# Patient Record
Sex: FEMALE | Race: WHITE | Employment: OTHER | URBAN - METROPOLITAN AREA
[De-identification: names, ages, dates, MRNs, and addresses within clinical notes are randomized per-mention and may not be internally consistent; named-entity substitution may affect disease eponyms.]

---

## 2021-05-24 ENCOUNTER — APPOINTMENT (OUTPATIENT)
Dept: CT IMAGING | Age: 41
End: 2021-05-24
Attending: EMERGENCY MEDICINE
Payer: MEDICAID

## 2021-05-24 ENCOUNTER — APPOINTMENT (OUTPATIENT)
Dept: NUCLEAR MEDICINE | Age: 41
End: 2021-05-24
Attending: EMERGENCY MEDICINE
Payer: MEDICAID

## 2021-05-24 ENCOUNTER — APPOINTMENT (OUTPATIENT)
Dept: CT IMAGING | Age: 41
End: 2021-05-24
Attending: FAMILY MEDICINE
Payer: MEDICAID

## 2021-05-24 ENCOUNTER — HOSPITAL ENCOUNTER (OUTPATIENT)
Age: 41
Setting detail: OBSERVATION
Discharge: HOME | End: 2021-05-27
Attending: EMERGENCY MEDICINE | Admitting: FAMILY MEDICINE
Payer: MEDICAID

## 2021-05-24 ENCOUNTER — APPOINTMENT (OUTPATIENT)
Dept: ULTRASOUND IMAGING | Age: 41
End: 2021-05-24
Attending: EMERGENCY MEDICINE
Payer: MEDICAID

## 2021-05-24 ENCOUNTER — APPOINTMENT (OUTPATIENT)
Dept: GENERAL RADIOLOGY | Age: 41
End: 2021-05-24
Attending: EMERGENCY MEDICINE
Payer: MEDICAID

## 2021-05-24 DIAGNOSIS — N30.00 ACUTE CYSTITIS WITHOUT HEMATURIA: ICD-10-CM

## 2021-05-24 DIAGNOSIS — R73.9 HYPERGLYCEMIA: ICD-10-CM

## 2021-05-24 DIAGNOSIS — R94.31 ABNORMAL EKG: ICD-10-CM

## 2021-05-24 DIAGNOSIS — R53.83 FATIGUE, UNSPECIFIED TYPE: Primary | ICD-10-CM

## 2021-05-24 DIAGNOSIS — D72.829 LEUKOCYTOSIS, UNSPECIFIED TYPE: ICD-10-CM

## 2021-05-24 PROBLEM — R09.02 HYPOXIA: Status: ACTIVE | Noted: 2021-05-24

## 2021-05-24 PROBLEM — R93.89 ABNORMAL CXR: Status: ACTIVE | Noted: 2021-05-24

## 2021-05-24 LAB
ALBUMIN SERPL-MCNC: 1.8 G/DL (ref 3.4–5)
ALBUMIN/GLOB SERPL: 0.4 {RATIO} (ref 0.8–1.7)
ALP SERPL-CCNC: 934 U/L (ref 45–117)
ALT SERPL-CCNC: 27 U/L (ref 13–56)
ANION GAP SERPL CALC-SCNC: 14 MMOL/L (ref 3–18)
APPEARANCE UR: ABNORMAL
ARTERIAL PATENCY WRIST A: ABNORMAL
AST SERPL-CCNC: 37 U/L (ref 10–38)
BACTERIA URNS QL MICRO: ABNORMAL /HPF
BASE DEFICIT BLD-SCNC: 3.4 MMOL/L
BASOPHILS # BLD: 0 K/UL (ref 0–0.1)
BASOPHILS NFR BLD: 0 % (ref 0–2)
BDY SITE: ABNORMAL
BILIRUB SERPL-MCNC: 3.5 MG/DL (ref 0.2–1)
BILIRUB UR QL: ABNORMAL
BODY TEMPERATURE: 97.7
BUN SERPL-MCNC: 36 MG/DL (ref 7–18)
BUN/CREAT SERPL: 26 (ref 12–20)
CALCIUM SERPL-MCNC: 9.1 MG/DL (ref 8.5–10.1)
CHLORIDE SERPL-SCNC: 88 MMOL/L (ref 100–111)
CK MB CFR SERPL CALC: ABNORMAL % (ref 0–4)
CK MB SERPL-MCNC: <1 NG/ML (ref 5–25)
CK SERPL-CCNC: 14 U/L (ref 26–192)
CO2 SERPL-SCNC: 23 MMOL/L (ref 21–32)
COLOR UR: ABNORMAL
CREAT SERPL-MCNC: 1.38 MG/DL (ref 0.6–1.3)
DIFFERENTIAL METHOD BLD: ABNORMAL
EOSINOPHIL # BLD: 0 K/UL (ref 0–0.4)
EOSINOPHIL NFR BLD: 0 % (ref 0–5)
EPITH CASTS URNS QL MICRO: ABNORMAL /LPF (ref 0–5)
ERYTHROCYTE [DISTWIDTH] IN BLOOD BY AUTOMATED COUNT: 13.8 % (ref 11.6–14.5)
GAS FLOW.O2 O2 DELIVERY SYS: ABNORMAL L/MIN
GAS FLOW.O2 SETTING OXYMISER: 22 BPM
GLOBULIN SER CALC-MCNC: 5.1 G/DL (ref 2–4)
GLUCOSE BLD STRIP.AUTO-MCNC: 493 MG/DL (ref 70–110)
GLUCOSE BLD STRIP.AUTO-MCNC: 501 MG/DL (ref 70–110)
GLUCOSE BLD STRIP.AUTO-MCNC: 501 MG/DL (ref 70–110)
GLUCOSE BLD STRIP.AUTO-MCNC: 533 MG/DL (ref 70–110)
GLUCOSE BLD STRIP.AUTO-MCNC: 567 MG/DL (ref 70–110)
GLUCOSE BLD STRIP.AUTO-MCNC: 586 MG/DL (ref 70–110)
GLUCOSE BLD STRIP.AUTO-MCNC: >600 MG/DL (ref 70–110)
GLUCOSE SERPL-MCNC: 571 MG/DL (ref 74–99)
GLUCOSE UR STRIP.AUTO-MCNC: >1000 MG/DL
HCG UR QL: NEGATIVE
HCO3 BLD-SCNC: 20.5 MMOL/L (ref 22–26)
HCT VFR BLD AUTO: 32.9 % (ref 35–45)
HGB BLD-MCNC: 10.8 G/DL (ref 12–16)
HGB UR QL STRIP: ABNORMAL
KETONES UR QL STRIP.AUTO: 15 MG/DL
LACTATE BLD-SCNC: 1.09 MMOL/L (ref 0.4–2)
LEUKOCYTE ESTERASE UR QL STRIP.AUTO: ABNORMAL
LYMPHOCYTES # BLD: 0.7 K/UL (ref 0.9–3.6)
LYMPHOCYTES NFR BLD: 3 % (ref 21–52)
MCH RBC QN AUTO: 30.3 PG (ref 24–34)
MCHC RBC AUTO-ENTMCNC: 32.8 G/DL (ref 31–37)
MCV RBC AUTO: 92.4 FL (ref 74–97)
MONOCYTES # BLD: 1 K/UL (ref 0.05–1.2)
MONOCYTES NFR BLD: 4 % (ref 3–10)
NEUTS BAND NFR BLD MANUAL: 6 % (ref 0–5)
NEUTS SEG # BLD: 23.1 K/UL (ref 1.8–8)
NEUTS SEG NFR BLD: 87 % (ref 40–73)
NITRITE UR QL STRIP.AUTO: NEGATIVE
O2/TOTAL GAS SETTING VFR VENT: 21 %
PCO2 BLD: 31.5 MMHG (ref 35–45)
PH BLD: 7.42 [PH] (ref 7.35–7.45)
PH UR STRIP: 5.5 [PH] (ref 5–8)
PLATELET # BLD AUTO: 416 K/UL (ref 135–420)
PMV BLD AUTO: 11.2 FL (ref 9.2–11.8)
PO2 BLD: 68 MMHG (ref 80–100)
POTASSIUM SERPL-SCNC: 3.5 MMOL/L (ref 3.5–5.5)
PROT SERPL-MCNC: 6.9 G/DL (ref 6.4–8.2)
PROT UR STRIP-MCNC: 30 MG/DL
RBC # BLD AUTO: 3.56 M/UL (ref 4.2–5.3)
RBC #/AREA URNS HPF: ABNORMAL /HPF (ref 0–5)
RBC MORPH BLD: ABNORMAL
SAO2 % BLD: 94.4 % (ref 92–97)
SERVICE CMNT-IMP: ABNORMAL
SODIUM SERPL-SCNC: 125 MMOL/L (ref 136–145)
SP GR UR REFRACTOMETRY: 1.02 (ref 1–1.03)
SPECIMEN TYPE: ABNORMAL
TROPONIN I SERPL-MCNC: 0.09 NG/ML (ref 0–0.04)
UROBILINOGEN UR QL STRIP.AUTO: 1 EU/DL (ref 0.2–1)
WBC # BLD AUTO: 24.8 K/UL (ref 4.6–13.2)
WBC URNS QL MICRO: ABNORMAL /HPF (ref 0–5)

## 2021-05-24 PROCEDURE — 99218 HC RM OBSERVATION: CPT

## 2021-05-24 PROCEDURE — 82553 CREATINE MB FRACTION: CPT

## 2021-05-24 PROCEDURE — 82962 GLUCOSE BLOOD TEST: CPT

## 2021-05-24 PROCEDURE — 74011636637 HC RX REV CODE- 636/637: Performed by: FAMILY MEDICINE

## 2021-05-24 PROCEDURE — 96375 TX/PRO/DX INJ NEW DRUG ADDON: CPT

## 2021-05-24 PROCEDURE — 76705 ECHO EXAM OF ABDOMEN: CPT

## 2021-05-24 PROCEDURE — 96365 THER/PROPH/DIAG IV INF INIT: CPT

## 2021-05-24 PROCEDURE — 99285 EMERGENCY DEPT VISIT HI MDM: CPT

## 2021-05-24 PROCEDURE — 93005 ELECTROCARDIOGRAM TRACING: CPT

## 2021-05-24 PROCEDURE — 70450 CT HEAD/BRAIN W/O DYE: CPT

## 2021-05-24 PROCEDURE — 96366 THER/PROPH/DIAG IV INF ADDON: CPT

## 2021-05-24 PROCEDURE — 87040 BLOOD CULTURE FOR BACTERIA: CPT

## 2021-05-24 PROCEDURE — 71045 X-RAY EXAM CHEST 1 VIEW: CPT

## 2021-05-24 PROCEDURE — 81001 URINALYSIS AUTO W/SCOPE: CPT

## 2021-05-24 PROCEDURE — 83605 ASSAY OF LACTIC ACID: CPT

## 2021-05-24 PROCEDURE — 36600 WITHDRAWAL OF ARTERIAL BLOOD: CPT

## 2021-05-24 PROCEDURE — 74011636637 HC RX REV CODE- 636/637: Performed by: EMERGENCY MEDICINE

## 2021-05-24 PROCEDURE — 74011250636 HC RX REV CODE- 250/636: Performed by: EMERGENCY MEDICINE

## 2021-05-24 PROCEDURE — 74011250636 HC RX REV CODE- 250/636: Performed by: FAMILY MEDICINE

## 2021-05-24 PROCEDURE — 81025 URINE PREGNANCY TEST: CPT

## 2021-05-24 PROCEDURE — 96376 TX/PRO/DX INJ SAME DRUG ADON: CPT

## 2021-05-24 PROCEDURE — 82803 BLOOD GASES ANY COMBINATION: CPT

## 2021-05-24 PROCEDURE — 80053 COMPREHEN METABOLIC PANEL: CPT

## 2021-05-24 PROCEDURE — 85025 COMPLETE CBC W/AUTO DIFF WBC: CPT

## 2021-05-24 PROCEDURE — 74011000250 HC RX REV CODE- 250: Performed by: EMERGENCY MEDICINE

## 2021-05-24 PROCEDURE — 87086 URINE CULTURE/COLONY COUNT: CPT

## 2021-05-24 PROCEDURE — 87389 HIV-1 AG W/HIV-1&-2 AB AG IA: CPT

## 2021-05-24 PROCEDURE — 96372 THER/PROPH/DIAG INJ SC/IM: CPT

## 2021-05-24 PROCEDURE — 65270000029 HC RM PRIVATE

## 2021-05-24 PROCEDURE — 96367 TX/PROPH/DG ADDL SEQ IV INF: CPT

## 2021-05-24 RX ORDER — ENOXAPARIN SODIUM 100 MG/ML
1.5 INJECTION SUBCUTANEOUS
Status: COMPLETED | OUTPATIENT
Start: 2021-05-24 | End: 2021-05-24

## 2021-05-24 RX ORDER — POLYETHYLENE GLYCOL 3350 17 G/17G
17 POWDER, FOR SOLUTION ORAL DAILY PRN
Status: DISCONTINUED | OUTPATIENT
Start: 2021-05-24 | End: 2021-05-27 | Stop reason: HOSPADM

## 2021-05-24 RX ORDER — ACETAMINOPHEN 650 MG/1
650 SUPPOSITORY RECTAL
Status: DISCONTINUED | OUTPATIENT
Start: 2021-05-24 | End: 2021-05-27 | Stop reason: HOSPADM

## 2021-05-24 RX ORDER — ENOXAPARIN SODIUM 100 MG/ML
40 INJECTION SUBCUTANEOUS DAILY
Status: DISCONTINUED | OUTPATIENT
Start: 2021-05-25 | End: 2021-05-24

## 2021-05-24 RX ORDER — SODIUM CHLORIDE 0.9 % (FLUSH) 0.9 %
5-40 SYRINGE (ML) INJECTION AS NEEDED
Status: DISCONTINUED | OUTPATIENT
Start: 2021-05-24 | End: 2021-05-27 | Stop reason: HOSPADM

## 2021-05-24 RX ORDER — PROMETHAZINE HYDROCHLORIDE 25 MG/1
12.5 TABLET ORAL
Status: DISCONTINUED | OUTPATIENT
Start: 2021-05-24 | End: 2021-05-27 | Stop reason: HOSPADM

## 2021-05-24 RX ORDER — ACETAMINOPHEN 325 MG/1
650 TABLET ORAL
Status: DISCONTINUED | OUTPATIENT
Start: 2021-05-24 | End: 2021-05-27 | Stop reason: HOSPADM

## 2021-05-24 RX ORDER — SODIUM CHLORIDE 0.9 % (FLUSH) 0.9 %
5-40 SYRINGE (ML) INJECTION EVERY 8 HOURS
Status: DISCONTINUED | OUTPATIENT
Start: 2021-05-25 | End: 2021-05-26

## 2021-05-24 RX ORDER — ONDANSETRON 2 MG/ML
4 INJECTION INTRAMUSCULAR; INTRAVENOUS
Status: DISCONTINUED | OUTPATIENT
Start: 2021-05-24 | End: 2021-05-27 | Stop reason: HOSPADM

## 2021-05-24 RX ORDER — NYSTATIN 100000 [USP'U]/ML
500000 SUSPENSION ORAL 4 TIMES DAILY
Status: DISCONTINUED | OUTPATIENT
Start: 2021-05-24 | End: 2021-05-27 | Stop reason: HOSPADM

## 2021-05-24 RX ORDER — FAMOTIDINE 20 MG/1
20 TABLET, FILM COATED ORAL 2 TIMES DAILY
Status: DISCONTINUED | OUTPATIENT
Start: 2021-05-24 | End: 2021-05-27 | Stop reason: HOSPADM

## 2021-05-24 RX ADMIN — SODIUM CHLORIDE 1000 ML: 900 INJECTION, SOLUTION INTRAVENOUS at 23:29

## 2021-05-24 RX ADMIN — HUMAN INSULIN 10 UNITS: 100 INJECTION, SOLUTION SUBCUTANEOUS at 22:38

## 2021-05-24 RX ADMIN — AZITHROMYCIN MONOHYDRATE 500 MG: 500 INJECTION, POWDER, LYOPHILIZED, FOR SOLUTION INTRAVENOUS at 21:44

## 2021-05-24 RX ADMIN — CEFTRIAXONE 1 G: 1 INJECTION, POWDER, FOR SOLUTION INTRAMUSCULAR; INTRAVENOUS at 20:45

## 2021-05-24 RX ADMIN — SODIUM CHLORIDE 1000 ML: 900 INJECTION, SOLUTION INTRAVENOUS at 21:44

## 2021-05-24 RX ADMIN — SODIUM CHLORIDE 1000 ML: 900 INJECTION, SOLUTION INTRAVENOUS at 17:54

## 2021-05-24 RX ADMIN — ENOXAPARIN SODIUM 160 MG: 80 INJECTION SUBCUTANEOUS at 22:24

## 2021-05-24 RX ADMIN — HUMAN INSULIN 10 UNITS: 100 INJECTION, SOLUTION SUBCUTANEOUS at 19:23

## 2021-05-24 NOTE — ED PROVIDER NOTES
EMERGENCY DEPARTMENT HISTORY AND PHYSICAL EXAM    Date: 5/24/2021  Patient Name: Katy Starr    History of Presenting Illness     Chief Complaint   Patient presents with    Fatigue       History Provided By: Patient     History Alonsoian Cabot):   6:16 PM  Katy Starr is a 38 y.o. female with PMHX of Diabetes, hypertension, hypothyroid, neuropathy, thrush who presents to the emergency department C/O fatigue onset several days ago. Associated sxs include lightheadedness. Pt denies any other sxs or complaints. Patient states she has had several visits to [facility] in [facility] with a diagnosis of UTI and an elevated white blood cell count states that she had negative blood cultures from those visits. Patient was off of home medications for a while, she is recently seen her doctor who has recently restarted all of her meds. Chief Complaint: Fatigue  Onset: Several days ago  Timing: Acute  Context: Patient is unsure brought symptoms on, symptoms have progressively, worsened since onset  Location: Generalized  Quality: Painless  Severity: Moderate  Modifying Factors: Nothing makes it better, or worse.   Associated Symptoms: Lightheadedness    PCP: Olivia Aparicio MD    Past History         Past Medical History:  Past Medical History:   Diagnosis Date    Diabetes ([facility] [address]     Hypertension     Hypothyroidism     Hypothyroidism     Morbid obesity ([facility] [address]     Neuropathy     Thrush        Past Surgical History:  Past Surgical History:   Procedure Laterality Date    HX TONSILLECTOMY         Family History:  Family History   Problem Relation Age of Onset    Asthma Mother     Hypertension Mother     Asthma Brother     Diabetes Maternal Grandmother     Hypertension Maternal Grandmother     Cancer Paternal Grandfather      Reviewed and non-contributory    Social History:  Social History     Tobacco Use    Smoking status: Never Smoker    Smokeless tobacco: Never Used   Substance Use Topics    Alcohol use: Never    Drug use: Never       Allergies: Allergies   Allergen Reactions    Codeine Itching    Gabapentin Other (comments)     \"Makes my skin crawl\"    Lyrica [Pregabalin] Other (comments)     \"Makes my skin crawl\"         Review of Systems      Review of Systems   Constitutional: Negative for chills and fever. HENT: Negative for congestion, rhinorrhea and sore throat. Eyes: Negative for pain and visual disturbance. Respiratory: Negative for cough, shortness of breath and wheezing. Cardiovascular: Positive for chest pain. Negative for palpitations. Gastrointestinal: Negative for abdominal pain, diarrhea and vomiting. Genitourinary: Negative for dysuria, flank pain, frequency and urgency. Musculoskeletal: Negative for arthralgias and myalgias. Skin: Negative for rash and wound. Neurological: Positive for weakness and light-headedness. Negative for speech difficulty and headaches. Psychiatric/Behavioral: Negative for agitation and confusion. All other systems reviewed and are negative. Physical Exam     Vitals:    05/27/21 0503 05/27/21 0754 05/27/21 1139 05/27/21 1318   BP: 134/82 (!) 146/86 (!) 146/89    Pulse: 77 78 77    Resp: 16 17 18    Temp: 97.6 °F (36.4 °C) 97.9 °F (36.6 °C) 97.7 °F (36.5 °C)    SpO2: 97% 100% 96%    Weight:       Height:    5' 4\" (1.626 m)       Physical Exam  Vitals and nursing note reviewed. Constitutional:       General: She is not in acute distress. Appearance: Normal appearance. She is normal weight. She is not ill-appearing. HENT:      Head: Normocephalic and atraumatic. Nose: Nose normal. No rhinorrhea. Mouth/Throat:      Mouth: Mucous membranes are moist.      Pharynx: No oropharyngeal exudate or posterior oropharyngeal erythema. Eyes:      Extraocular Movements: Extraocular movements intact. Conjunctiva/sclera: Conjunctivae normal.      Pupils: Pupils are equal, round, and reactive to light.    Cardiovascular: Rate and Rhythm: Normal rate and regular rhythm. Heart sounds: No murmur heard. No friction rub. No gallop. Pulmonary:      Effort: Pulmonary effort is normal. No respiratory distress. Breath sounds: Normal breath sounds. No wheezing, rhonchi or rales. Abdominal:      General: Bowel sounds are normal.      Palpations: Abdomen is soft. Tenderness: There is no abdominal tenderness. There is no guarding or rebound. Musculoskeletal:         General: No swelling, tenderness or deformity. Normal range of motion. Cervical back: Normal range of motion and neck supple. No rigidity. Lymphadenopathy:      Cervical: No cervical adenopathy. Skin:     General: Skin is warm and dry. Findings: No rash. Neurological:      General: No focal deficit present. Mental Status: She is alert and oriented to person, place, and time. Psychiatric:         Mood and Affect: Mood normal.         Behavior: Behavior normal.         Diagnostic Study Results     Labs -     No results found for this or any previous visit (from the past 12 hour(s)). Radiologic Studies -   NM HEPATOBILIARY DUCT SCAN   Final Result   ------------------------------------------------------------------------------   1. Normal filling of the gallbladder. ------------------------------------------------------------------------------      CT CHEST ABD PELV WO CONT   Final Result      Linear opacities at the lung bases most consistent with atelectasis and/or   scarring. Cholelithiasis. Minimal infiltrative change along the pancreas is a subtle finding. Please   correlate with pancreatic enzymes. NM LUNG SCAN PERF   Final Result      No evidence of pulmonary embolism. US ABD LTD   Final Result      Sludge and gallstones in the gallbladder without evidence of acute   cholecystitis. No acute or focal abnormality otherwise to explain patient's right upper   quadrant pain.       Enlarged liver with hepatic steatosis      CT HEAD WO CONT   Final Result      No acute intracranial abnormalities. XR CHEST PORT   Final Result   Minimal bibasilar atelectasis or infiltrate with bronchitis. CT Results  (Last 48 hours)    None        CXR Results  (Last 48 hours)               05/29/21 1650  XR CHEST PORT Final result    Impression:  Platelike atelectasis at both lung bases with blunting left   costophrenic angle suggesting small effusion and/or airspace disease. Narrative:  EXAM:  AP Portable Chest X-ray 1 view        INDICATION: Edema       COMPARISON: May 25, 2021       _______________       FINDINGS: Cardiac size appears enlarged partly due to AP magnification and   mediastinal contours are within normal limits for portable radiograph. There is   platelike atelectasis left midlung. Also platelike atelectasis at the right lung   base. There is blunting of the left costophrenic angle suggesting small effusion   and/or airspace disease. No acute osseous findings.        ________________                     Medications given in the ED-  Medications   iopamidoL (ISOVUE 300) 61 % contrast injection  mL (has no administration in time range)   sodium chloride 0.9 % bolus infusion 1,000 mL (0 mL IntraVENous IV Completed 5/24/21 1854)   insulin regular (NOVOLIN R, HUMULIN R) injection 10 Units (10 Units IntraVENous Given 5/24/21 1923)   cefTRIAXone (ROCEPHIN) 1 g in sterile water (preservative free) 10 mL IV syringe (0 g IntraVENous IV Completed 5/24/21 2048)   sodium chloride 0.9 % bolus infusion 1,000 mL (0 mL IntraVENous IV Completed 5/24/21 2244)   azithromycin (ZITHROMAX) 500 mg in 0.9% sodium chloride 250 mL (VIAL-MATE) (0 mg IntraVENous IV Completed 5/24/21 2244)   enoxaparin (LOVENOX) injection 160 mg (160 mg SubCUTAneous Given 5/24/21 2224)   sodium chloride 0.9 % bolus infusion 1,000 mL (1,000 mL IntraVENous New Bag 5/24/21 2329)   insulin regular (NOVOLIN R, HUMULIN R) injection 10 Units (10 Units IntraVENous Given 5/24/21 2238)   technetium albumin aggregated (MAA) solution 6 millicurie (6 millicuries IntraVENous Given 5/24/21 2332)   insulin regular (NOVOLIN R, HUMULIN R) injection 10 Units (10 Units IntraVENous Given 5/25/21 0200)   potassium bicarb-citric acid (EFFER-K) tablet 40 mEq (40 mEq Oral Given 5/25/21 0534)   sodium chloride 0.9 % bolus infusion 1,000 mL (1,000 mL IntraVENous New Bag 5/25/21 0933)   potassium chloride (K-DUR, KLOR-CON) SR tablet 40 mEq (40 mEq Oral Given 5/25/21 1342)   sterile water (preservative free) injection (5 mL IntraVENous Given 5/26/21 1106)   [facility]) injection 2.09 mcg (2.09 mcg IntraVENous Given 5/26/21 1107)   technetium mebrofenin (CHOLETEC) injection 7.2 millicurie (7.2 millicuries IntraVENous Given 5/26/21 1137)         Medical Decision Making   I am the first provider for this patient. I reviewed the vital signs, available nursing notes, past medical history, past surgical history, family history and social history. Vital Signs-Reviewed the patient's vital signs. Pulse Oximetry Analysis - 99% on RA     Cardiac Monitor:  Rate: 92 bpm  Rhythm: tachycardic rhythm    EKG interpretation: (Preliminary)  Rhythm: Sinus tachycardia. Rate: 103 bpm; no STEMI  EKG read by Waleska Gomez MD at 5:08 PM    Records Reviewed: Nursing Notes    Provider Notes (Medical Decision Making):   DDX: DKA, UTI, hyperglycemia, sepsis, bacteremia    Procedures:  Procedures    ED Course:   6:16 PM Initial assessment performed. The patients presenting problems have been discussed, and they are in agreement with the care plan formulated and outlined with them. I have encouraged them to ask questions as they arise throughout their visit. SIGN OUT:  8:07 PM  Patient's presentation, labs/imaging and plan of care was reviewed with Bre Leigh MD  as part of sign out. They will follow up with US and accucheck as part of the plan discussed with the patient.   Gary's assistance in completion of this plan is greatly appreciated but it should be noted that I will be the provider of record for this patient. Written by Odilia Mcnulty MD.    10:00 PM Jose E Roger MD discussed with Dr. Arthur Jacobson for telemetry admission    Diagnosis and Disposition     Discussion:  38 y.o. female with hyperglycemia, likely noncompliance with outpatient medication. Patient also has a worsening leukocytosis. She has elevated LFTs. Patient had an ultrasound which did not demonstrate acute cholecystitis. However due to her markedly elevated blood glucose without acidosis she was still require admission to the hospital for hyperglycemia and diabetic education. Patient and family understand agree with this plan. Critical Care Time: 0 minutes    Core Measures:  For Hospitalized Patients:    1. Hospitalization Decision Time:  The decision to hospitalize the patient was made by Tyler Velásquez MD, MD at 8:07 on 5/24/2021    2. Aspirin: Aspirin was not given because the patient did not present with a stroke at the time of their Emergency Department evaluation    10:00 PM Patient is being admitted to the hospital by Dr. Arthur Jacobson. The results of their tests and reasons for their admission have been discussed with them and/or available family. They convey agreement and understanding for the need to be admitted and for their admission diagnosis. CONDITIONS ON ADMISSION:  Sepsis is not present at the time of admission. Deep Vein Thrombosis is not present at the time of admission. Thrombosis is not present at the time of admission. Urinary Tract Infection is present at the time of admission. Pneumonia is not present at the time of admission. MRSA is not present at the time of admission. Wound infection is not present at the time of admission. Pressure Ulcer is not present at the time of admission. CLINICAL IMPRESSION:    1. Fatigue, unspecified type    2.  Hyperglycemia 3. Acute cystitis without hematuria    4. Leukocytosis, unspecified type    5. Abnormal EKG      Dragon Disclaimer     Please note that this dictation was completed with Hitch, the computer voice recognition software. Quite often unanticipated grammatical, syntax, homophones, and other interpretive errors are inadvertently transcribed by the computer software. Please disregard these errors. Please excuse any errors that have escaped final proofreading.     Shazia Estrella MD

## 2021-05-25 ENCOUNTER — APPOINTMENT (OUTPATIENT)
Dept: NON INVASIVE DIAGNOSTICS | Age: 41
End: 2021-05-25
Attending: HOSPITALIST
Payer: MEDICAID

## 2021-05-25 ENCOUNTER — APPOINTMENT (OUTPATIENT)
Dept: CT IMAGING | Age: 41
End: 2021-05-25
Attending: FAMILY MEDICINE
Payer: MEDICAID

## 2021-05-25 PROBLEM — E66.01 MORBID OBESITY (HCC): Status: ACTIVE | Noted: 2021-05-25

## 2021-05-25 PROBLEM — Z91.14 NONCOMPLIANCE WITH MEDICATION REGIMEN: Status: ACTIVE | Noted: 2021-05-25

## 2021-05-25 PROBLEM — Z78.9 FAILURE OF OUTPATIENT TREATMENT: Status: ACTIVE | Noted: 2021-05-25

## 2021-05-25 PROBLEM — K80.20 CHOLELITHIASIS: Status: ACTIVE | Noted: 2021-05-25

## 2021-05-25 PROBLEM — I10 HYPERTENSION: Status: ACTIVE | Noted: 2021-05-25

## 2021-05-25 PROBLEM — E11.65 HYPERGLYCEMIA DUE TO TYPE 2 DIABETES MELLITUS (HCC): Status: ACTIVE | Noted: 2021-05-25

## 2021-05-25 PROBLEM — E87.6 HYPOKALEMIA: Status: ACTIVE | Noted: 2021-05-25

## 2021-05-25 PROBLEM — E86.0 SEVERE DEHYDRATION: Status: ACTIVE | Noted: 2021-05-25

## 2021-05-25 PROBLEM — B37.0 THRUSH: Status: ACTIVE | Noted: 2021-05-25

## 2021-05-25 PROBLEM — N17.9 ACUTE KIDNEY INJURY (HCC): Status: ACTIVE | Noted: 2021-05-25

## 2021-05-25 LAB
ALBUMIN SERPL-MCNC: 1.5 G/DL (ref 3.4–5)
ALBUMIN/GLOB SERPL: 0.3 {RATIO} (ref 0.8–1.7)
ALP SERPL-CCNC: 724 U/L (ref 45–117)
ALT SERPL-CCNC: 20 U/L (ref 13–56)
ANION GAP SERPL CALC-SCNC: 10 MMOL/L (ref 3–18)
ANION GAP SERPL CALC-SCNC: 11 MMOL/L (ref 3–18)
AST SERPL-CCNC: 27 U/L (ref 10–38)
ATRIAL RATE: 103 BPM
AV R PG: 73.1 MMHG
BILIRUB SERPL-MCNC: 2.5 MG/DL (ref 0.2–1)
BUN SERPL-MCNC: 29 MG/DL (ref 7–18)
BUN SERPL-MCNC: 32 MG/DL (ref 7–18)
BUN/CREAT SERPL: 25 (ref 12–20)
BUN/CREAT SERPL: 27 (ref 12–20)
CALCIUM SERPL-MCNC: 7.8 MG/DL (ref 8.5–10.1)
CALCIUM SERPL-MCNC: 9 MG/DL (ref 8.5–10.1)
CALCULATED P AXIS, ECG09: 7 DEGREES
CALCULATED R AXIS, ECG10: -27 DEGREES
CALCULATED T AXIS, ECG11: 116 DEGREES
CHLORIDE SERPL-SCNC: 98 MMOL/L (ref 100–111)
CHLORIDE SERPL-SCNC: 99 MMOL/L (ref 100–111)
CO2 SERPL-SCNC: 24 MMOL/L (ref 21–32)
CO2 SERPL-SCNC: 24 MMOL/L (ref 21–32)
COVID-19 RAPID TEST, COVR: NOT DETECTED
CREAT SERPL-MCNC: 1.14 MG/DL (ref 0.6–1.3)
CREAT SERPL-MCNC: 1.19 MG/DL (ref 0.6–1.3)
DIAGNOSIS, 93000: NORMAL
ECHO AO ROOT DIAM: 2.88 CM
ECHO AR MAX VEL PISA: 427.48 CM/S
ECHO AV AREA PEAK VELOCITY: 2.78 CM2
ECHO AV AREA VTI: 2.64 CM2
ECHO AV AREA/BSA PEAK VELOCITY: 1.3 CM2/M2
ECHO AV AREA/BSA VTI: 1.3 CM2/M2
ECHO AV MEAN GRADIENT: 12.34 MMHG
ECHO AV PEAK GRADIENT: 18.17 MMHG
ECHO AV PEAK VELOCITY: 213.13 CM/S
ECHO AV REGURGITANT PHT: 585.58 MS
ECHO AV VTI: 44.18 CM
ECHO EST RA PRESSURE: 8 MMHG
ECHO IVC PROX: 2.27 CM
ECHO LA MAJOR AXIS: 3.01 CM
ECHO LA MINOR AXIS: 1.45 CM
ECHO LA VOL 2C: 46.39 ML (ref 22–52)
ECHO LA VOL 4C: 66.87 ML (ref 22–52)
ECHO LA VOL BP: 68.96 ML (ref 22–52)
ECHO LA VOL/BSA BIPLANE: 33.15 ML/M2 (ref 16–28)
ECHO LA VOLUME INDEX A2C: 22.3 ML/M2 (ref 16–28)
ECHO LA VOLUME INDEX A4C: 32.15 ML/M2 (ref 16–28)
ECHO LV E' LATERAL VELOCITY: 8 CM/S
ECHO LV E' SEPTAL VELOCITY: 8 CM/S
ECHO LV EDV A2C: 162.48 ML
ECHO LV EDV A4C: 204.56 ML
ECHO LV EDV BP: 185.94 ML (ref 56–104)
ECHO LV EDV INDEX A4C: 98.3 ML/M2
ECHO LV EDV INDEX BP: 89.4 ML/M2
ECHO LV EDV NDEX A2C: 78.1 ML/M2
ECHO LV EJECTION FRACTION A2C: 64 PERCENT
ECHO LV EJECTION FRACTION A4C: 54 PERCENT
ECHO LV EJECTION FRACTION BIPLANE: 53.2 PERCENT (ref 55–100)
ECHO LV ESV A2C: 58.37 ML
ECHO LV ESV A4C: 94.24 ML
ECHO LV ESV BP: 86.99 ML (ref 19–49)
ECHO LV ESV INDEX A2C: 28.1 ML/M2
ECHO LV ESV INDEX A4C: 45.3 ML/M2
ECHO LV ESV INDEX BP: 41.8 ML/M2
ECHO LV INTERNAL DIMENSION DIASTOLIC: 5.71 CM (ref 3.9–5.3)
ECHO LV INTERNAL DIMENSION SYSTOLIC: 4.27 CM
ECHO LV IVSD: 1.42 CM (ref 0.6–0.9)
ECHO LV MASS 2D: 318 G (ref 67–162)
ECHO LV MASS INDEX 2D: 152.9 G/M2 (ref 43–95)
ECHO LV POSTERIOR WALL DIASTOLIC: 1.15 CM (ref 0.6–0.9)
ECHO LVOT DIAM: 2.29 CM
ECHO LVOT PEAK GRADIENT: 8.26 MMHG
ECHO LVOT PEAK VELOCITY: 143.68 CM/S
ECHO LVOT SV: 116.9 ML
ECHO LVOT VTI: 28.3 CM
ECHO MV A VELOCITY: 120.43 CM/S
ECHO MV AREA PHT: 7.3 CM2
ECHO MV E DECELERATION TIME (DT): 103.91 MS
ECHO MV E VELOCITY: 88.89 CM/S
ECHO MV E/A RATIO: 0.74
ECHO MV E/E' LATERAL: 11.11
ECHO MV E/E' RATIO (AVERAGED): 11.11
ECHO MV E/E' SEPTAL: 11.11
ECHO MV PRESSURE HALF TIME (PHT): 30.13 MS
ECHO RA AREA 4C: 12.26 CM2
ECHO RV INTERNAL DIMENSION: 3.51 CM
ECHO RV TAPSE: 1.7 CM (ref 1.5–2)
ECHO TV REGURGITANT MAX VELOCITY: 237.69 CM/S
ECHO TV REGURGITANT PEAK GRADIENT: 22.6 MMHG
ERYTHROCYTE [DISTWIDTH] IN BLOOD BY AUTOMATED COUNT: 13.4 % (ref 11.6–14.5)
GLOBULIN SER CALC-MCNC: 4.3 G/DL (ref 2–4)
GLUCOSE BLD STRIP.AUTO-MCNC: 254 MG/DL (ref 70–110)
GLUCOSE BLD STRIP.AUTO-MCNC: 343 MG/DL (ref 70–110)
GLUCOSE BLD STRIP.AUTO-MCNC: 356 MG/DL (ref 70–110)
GLUCOSE BLD STRIP.AUTO-MCNC: 372 MG/DL (ref 70–110)
GLUCOSE BLD STRIP.AUTO-MCNC: 507 MG/DL (ref 70–110)
GLUCOSE SERPL-MCNC: 357 MG/DL (ref 74–99)
GLUCOSE SERPL-MCNC: 364 MG/DL (ref 74–99)
HBA1C MFR BLD: 13.7 % (ref 4.2–5.6)
HCT VFR BLD AUTO: 26.4 % (ref 35–45)
HGB BLD-MCNC: 8.7 G/DL (ref 12–16)
HIV 1+2 AB+HIV1 P24 AG SERPL QL IA: NONREACTIVE
HIV12 RESULT COMMENT, HHIVC: NORMAL
LIPASE SERPL-CCNC: 340 U/L (ref 73–393)
LVOT MG: 5.69 MMHG
MAGNESIUM SERPL-MCNC: 2.1 MG/DL (ref 1.6–2.6)
MCH RBC QN AUTO: 30 PG (ref 24–34)
MCHC RBC AUTO-ENTMCNC: 33 G/DL (ref 31–37)
MCV RBC AUTO: 91 FL (ref 74–97)
P-R INTERVAL, ECG05: 168 MS
PLATELET # BLD AUTO: 360 K/UL (ref 135–420)
PMV BLD AUTO: 11.2 FL (ref 9.2–11.8)
POTASSIUM SERPL-SCNC: 2.8 MMOL/L (ref 3.5–5.5)
POTASSIUM SERPL-SCNC: 3.9 MMOL/L (ref 3.5–5.5)
PROT SERPL-MCNC: 5.8 G/DL (ref 6.4–8.2)
Q-T INTERVAL, ECG07: 366 MS
QRS DURATION, ECG06: 112 MS
QTC CALCULATION (BEZET), ECG08: 479 MS
RBC # BLD AUTO: 2.9 M/UL (ref 4.2–5.3)
SODIUM SERPL-SCNC: 132 MMOL/L (ref 136–145)
SODIUM SERPL-SCNC: 134 MMOL/L (ref 136–145)
SOURCE, COVRS: NORMAL
VENTRICULAR RATE, ECG03: 103 BPM
WBC # BLD AUTO: 22 K/UL (ref 4.6–13.2)

## 2021-05-25 PROCEDURE — 36415 COLL VENOUS BLD VENIPUNCTURE: CPT

## 2021-05-25 PROCEDURE — 85027 COMPLETE CBC AUTOMATED: CPT

## 2021-05-25 PROCEDURE — 96376 TX/PRO/DX INJ SAME DRUG ADON: CPT

## 2021-05-25 PROCEDURE — 80053 COMPREHEN METABOLIC PANEL: CPT

## 2021-05-25 PROCEDURE — 83036 HEMOGLOBIN GLYCOSYLATED A1C: CPT

## 2021-05-25 PROCEDURE — 77010033678 HC OXYGEN DAILY

## 2021-05-25 PROCEDURE — 96372 THER/PROPH/DIAG INJ SC/IM: CPT

## 2021-05-25 PROCEDURE — 83735 ASSAY OF MAGNESIUM: CPT

## 2021-05-25 PROCEDURE — 74011250636 HC RX REV CODE- 250/636: Performed by: HOSPITALIST

## 2021-05-25 PROCEDURE — 74011636637 HC RX REV CODE- 636/637: Performed by: HOSPITALIST

## 2021-05-25 PROCEDURE — 87635 SARS-COV-2 COVID-19 AMP PRB: CPT

## 2021-05-25 PROCEDURE — 74011636637 HC RX REV CODE- 636/637: Performed by: FAMILY MEDICINE

## 2021-05-25 PROCEDURE — 99218 HC RM OBSERVATION: CPT

## 2021-05-25 PROCEDURE — 74011250637 HC RX REV CODE- 250/637: Performed by: HOSPITALIST

## 2021-05-25 PROCEDURE — 74176 CT ABD & PELVIS W/O CONTRAST: CPT

## 2021-05-25 PROCEDURE — 74011000258 HC RX REV CODE- 258: Performed by: HOSPITALIST

## 2021-05-25 PROCEDURE — 74011000258 HC RX REV CODE- 258: Performed by: FAMILY MEDICINE

## 2021-05-25 PROCEDURE — 83690 ASSAY OF LIPASE: CPT

## 2021-05-25 PROCEDURE — 74011250637 HC RX REV CODE- 250/637: Performed by: FAMILY MEDICINE

## 2021-05-25 PROCEDURE — 96375 TX/PRO/DX INJ NEW DRUG ADDON: CPT

## 2021-05-25 PROCEDURE — 74011250636 HC RX REV CODE- 250/636: Performed by: FAMILY MEDICINE

## 2021-05-25 PROCEDURE — 65660000000 HC RM CCU STEPDOWN

## 2021-05-25 PROCEDURE — 93306 TTE W/DOPPLER COMPLETE: CPT

## 2021-05-25 RX ORDER — INSULIN LISPRO 100 [IU]/ML
4 INJECTION, SOLUTION INTRAVENOUS; SUBCUTANEOUS EVERY 4 HOURS
Status: DISCONTINUED | OUTPATIENT
Start: 2021-05-25 | End: 2021-05-25

## 2021-05-25 RX ORDER — SODIUM CHLORIDE 9 MG/ML
125 INJECTION, SOLUTION INTRAVENOUS CONTINUOUS
Status: DISCONTINUED | OUTPATIENT
Start: 2021-05-25 | End: 2021-05-27 | Stop reason: HOSPADM

## 2021-05-25 RX ORDER — INSULIN GLARGINE 100 [IU]/ML
15 INJECTION, SOLUTION SUBCUTANEOUS
Status: DISCONTINUED | OUTPATIENT
Start: 2021-05-25 | End: 2021-05-25

## 2021-05-25 RX ORDER — INSULIN GLARGINE 100 [IU]/ML
21 INJECTION, SOLUTION SUBCUTANEOUS DAILY
Status: DISCONTINUED | OUTPATIENT
Start: 2021-05-26 | End: 2021-05-25

## 2021-05-25 RX ORDER — POTASSIUM CHLORIDE 20 MEQ/1
40 TABLET, EXTENDED RELEASE ORAL
Status: COMPLETED | OUTPATIENT
Start: 2021-05-25 | End: 2021-05-25

## 2021-05-25 RX ORDER — KETOROLAC TROMETHAMINE 15 MG/ML
15 INJECTION, SOLUTION INTRAMUSCULAR; INTRAVENOUS
Status: DISCONTINUED | OUTPATIENT
Start: 2021-05-25 | End: 2021-05-27 | Stop reason: HOSPADM

## 2021-05-25 RX ORDER — INSULIN LISPRO 100 [IU]/ML
4 INJECTION, SOLUTION INTRAVENOUS; SUBCUTANEOUS EVERY 6 HOURS
Status: DISCONTINUED | OUTPATIENT
Start: 2021-05-25 | End: 2021-05-26

## 2021-05-25 RX ORDER — GUAIFENESIN 100 MG/5ML
81 LIQUID (ML) ORAL DAILY
Status: DISCONTINUED | OUTPATIENT
Start: 2021-05-25 | End: 2021-05-27 | Stop reason: HOSPADM

## 2021-05-25 RX ORDER — INSULIN GLARGINE 100 [IU]/ML
21 INJECTION, SOLUTION SUBCUTANEOUS
Status: DISCONTINUED | OUTPATIENT
Start: 2021-05-25 | End: 2021-05-27 | Stop reason: HOSPADM

## 2021-05-25 RX ORDER — ENOXAPARIN SODIUM 100 MG/ML
40 INJECTION SUBCUTANEOUS EVERY 24 HOURS
Status: DISCONTINUED | OUTPATIENT
Start: 2021-05-25 | End: 2021-05-27 | Stop reason: HOSPADM

## 2021-05-25 RX ORDER — TRAMADOL HYDROCHLORIDE 50 MG/1
50 TABLET ORAL
Status: DISCONTINUED | OUTPATIENT
Start: 2021-05-25 | End: 2021-05-27 | Stop reason: HOSPADM

## 2021-05-25 RX ORDER — INSULIN LISPRO 100 [IU]/ML
INJECTION, SOLUTION INTRAVENOUS; SUBCUTANEOUS EVERY 6 HOURS
Status: DISCONTINUED | OUTPATIENT
Start: 2021-05-25 | End: 2021-05-26

## 2021-05-25 RX ORDER — LEVOTHYROXINE SODIUM 75 UG/1
75 TABLET ORAL
Status: DISCONTINUED | OUTPATIENT
Start: 2021-05-25 | End: 2021-05-27 | Stop reason: HOSPADM

## 2021-05-25 RX ADMIN — ENOXAPARIN SODIUM 40 MG: 40 INJECTION SUBCUTANEOUS at 22:23

## 2021-05-25 RX ADMIN — ACETAMINOPHEN 650 MG: 325 TABLET ORAL at 01:13

## 2021-05-25 RX ADMIN — PIPERACILLIN AND TAZOBACTAM 3.38 G: 3; .375 INJECTION, POWDER, LYOPHILIZED, FOR SOLUTION INTRAVENOUS at 01:14

## 2021-05-25 RX ADMIN — INSULIN GLARGINE 15 UNITS: 100 INJECTION, SOLUTION SUBCUTANEOUS at 02:19

## 2021-05-25 RX ADMIN — POTASSIUM CHLORIDE 40 MEQ: 1500 TABLET, EXTENDED RELEASE ORAL at 13:42

## 2021-05-25 RX ADMIN — SODIUM CHLORIDE 125 ML/HR: 900 INJECTION, SOLUTION INTRAVENOUS at 02:21

## 2021-05-25 RX ADMIN — TRAMADOL HYDROCHLORIDE 50 MG: 50 TABLET, FILM COATED ORAL at 13:59

## 2021-05-25 RX ADMIN — SODIUM CHLORIDE 1000 ML: 900 INJECTION, SOLUTION INTRAVENOUS at 09:33

## 2021-05-25 RX ADMIN — NYSTATIN 500000 UNITS: 100000 SUSPENSION ORAL at 01:13

## 2021-05-25 RX ADMIN — DOXYCYCLINE 100 MG: 100 INJECTION, POWDER, LYOPHILIZED, FOR SOLUTION INTRAVENOUS at 13:41

## 2021-05-25 RX ADMIN — DOXYCYCLINE 100 MG: 100 INJECTION, POWDER, LYOPHILIZED, FOR SOLUTION INTRAVENOUS at 22:22

## 2021-05-25 RX ADMIN — KETOROLAC TROMETHAMINE 15 MG: 15 INJECTION, SOLUTION INTRAMUSCULAR; INTRAVENOUS at 21:01

## 2021-05-25 RX ADMIN — NYSTATIN 500000 UNITS: 100000 SUSPENSION ORAL at 22:23

## 2021-05-25 RX ADMIN — POTASSIUM CHLORIDE 40 MEQ: 1500 TABLET, EXTENDED RELEASE ORAL at 11:08

## 2021-05-25 RX ADMIN — INSULIN LISPRO 8 UNITS: 100 INJECTION, SOLUTION INTRAVENOUS; SUBCUTANEOUS at 18:13

## 2021-05-25 RX ADMIN — Medication 10 ML: at 16:00

## 2021-05-25 RX ADMIN — NYSTATIN 500000 UNITS: 100000 SUSPENSION ORAL at 09:15

## 2021-05-25 RX ADMIN — FAMOTIDINE 20 MG: 20 TABLET ORAL at 01:13

## 2021-05-25 RX ADMIN — HUMAN INSULIN 10 UNITS: 100 INJECTION, SOLUTION SUBCUTANEOUS at 02:00

## 2021-05-25 RX ADMIN — INSULIN LISPRO 4 UNITS: 100 INJECTION, SOLUTION INTRAVENOUS; SUBCUTANEOUS at 18:12

## 2021-05-25 RX ADMIN — LEVOTHYROXINE SODIUM 75 MCG: 0.07 TABLET ORAL at 05:34

## 2021-05-25 RX ADMIN — PIPERACILLIN AND TAZOBACTAM 3.38 G: 3; .375 INJECTION, POWDER, LYOPHILIZED, FOR SOLUTION INTRAVENOUS at 18:00

## 2021-05-25 RX ADMIN — PIPERACILLIN AND TAZOBACTAM 3.38 G: 3; .375 INJECTION, POWDER, LYOPHILIZED, FOR SOLUTION INTRAVENOUS at 13:43

## 2021-05-25 RX ADMIN — Medication 10 ML: at 00:00

## 2021-05-25 RX ADMIN — INSULIN GLARGINE 21 UNITS: 100 INJECTION, SOLUTION SUBCUTANEOUS at 22:23

## 2021-05-25 RX ADMIN — INSULIN LISPRO 10 UNITS: 100 INJECTION, SOLUTION INTRAVENOUS; SUBCUTANEOUS at 13:42

## 2021-05-25 RX ADMIN — POTASSIUM BICARBONATE 40 MEQ: 782 TABLET, EFFERVESCENT ORAL at 05:34

## 2021-05-25 RX ADMIN — Medication 10 ML: at 08:00

## 2021-05-25 RX ADMIN — PIPERACILLIN AND TAZOBACTAM 3.38 G: 3; .375 INJECTION, POWDER, LYOPHILIZED, FOR SOLUTION INTRAVENOUS at 05:33

## 2021-05-25 NOTE — DIABETES MGMT
GLYCEMIC CONTROL PLAN OF CARE     Assessment:  Diabetes Management consult received to assess home management. Pt admitted with uncontrolled DM and hyperglycemia due to DM, leukocytosis , cholithiasis. Pt states she was diagnosed with T2DM about 5 years ago. She once took Lantus (pens) for about 1 year but does not remember the dose. She relates she stopped taking it due to being busy with her children (has 2 young children with special needs). She also reports she  once took low dose of metformin for a couple of months but could not tolerate due to GI side effects. She hs not taken any DM medications for the past couple of years but started Victoza about 2 weeks ago. She does not take any other DM medications at this time. She has a glucometer but \"it is old\", ~ 3years old. She state she had trouble in the past with her insurance covering test strips. Pt is currently NPO with basal insulin, corrective lispro as needed. Recommendations:  1. For discharge in patient with A1C > 13.0%, suggest:   · Lantus (Solostar pens), dose to be determined by requirements seen during hospitalization. · Continue Victoza (1.8 mg/day)  2. On-going DM education - see Diabetes Education Record for details. 3. Will provide pt with updated meter starter kit for home (Contour Next EZ meter,  10 test strips and 10 lancets). Most recent blood glucose values:  5/25:  Lab - 357;  POC - 356  5/24:  Lab - 571:  POC - > 600, 567, 533, 493        Current A1C of 13.7 % is equivalent to average blood glucose of 351 mg/dl over the past 2-3 months.     Current hospital diabetes medications:   21 units Lantus/day  4 units lispro q 6 hours  Corrective lispro, normal insulin sensitivity q 6 hours    Previous day's insulin requirements:  Not here for full day  Received 20 units regular insulin    Home diabetes medications: per pt:  Victoza 1.8 mg/day (started 2 weeks ago)    Diet:  DIET NPO  DIET NUTRITIONAL SUPPLEMENTS     Pt  reports she has not eaten for the past 9 days and has lost 5 lbs. She has oral thrush (noted  Nystatin in ordered)  She once consumed regular soda \"all day\" but stopped doing so about 2 weeks ago.     Education:  _x___Refer to Diabetes Education Record             ____Education not indicated at this time    Sade Watts, [address], [address] [address]  Diabetes and Glycemic Control   Office:  [phone]  Pager:  [phone]

## 2021-05-25 NOTE — ROUTINE PROCESS
Bedside and Verbal shift change report given to Georgie Santana RN  (oncoming nurse) by David Amador RN  (offgoing nurse). Report included the following information SBAR, Kardex, Procedure Summary and Recent Results.

## 2021-05-25 NOTE — PROGRESS NOTES
Hospitalist Progress Note-critical care note     Patient: Matteo Parra MRN: [id number]  CSN: [id number]    YOB: 1980  Age: 38 y.o.   Sex: female    DOA: 5/24/2021 LOS:  LOS: 1 day            Chief complaint: non compliance with medication, uncontrolled DM and hyperglycemia due to DM, leukocytosis , cholithiasis     Assessment/Plan         Hospital Problems  Date Reviewed: 5/25/2021        Codes Class Noted POA    Type II diabetes mellitus with complication, uncontrolled ([facility] ICD-10-CM: E11.8, E11.65  ICD-9-CM: 250.92  5/25/2021 Yes        Noncompliance with medication regimen ICD-10-CM: Z91.14  ICD-9-CM: V15.81  5/25/2021 Yes        Failure of outpatient treatment ICD-10-CM: Z78.9  ICD-9-CM: V49.89  5/25/2021 Yes        Acute kidney injury [facility]) ICD-10-CM: N17.9  ICD-9-CM: 584.9  5/25/2021 Yes        Thrush ICD-10-CM: B37.0  ICD-9-CM: 112.0  5/25/2021 Yes        Morbid obesity ([facility] ICD-10-CM: E66.01  ICD-9-CM: 278.01  5/25/2021 Yes        Cholelithiasis ICD-10-CM: K80.20  ICD-9-CM: 574.20  5/25/2021 Yes        Hypertension ICD-10-CM: I10  ICD-9-CM: 401.9  5/25/2021 Yes        Severe dehydration ICD-10-CM: E86.0  ICD-9-CM: 276.51  5/25/2021 Unknown        Leukocytosis ICD-10-CM: D72.829  ICD-9-CM: 288.60  5/24/2021 Yes        * (Principal) Hypoxia ICD-10-CM: R09.02  ICD-9-CM: 799.02  5/24/2021 Yes               Hypoxia  V/q scan no pe   cxr:Minimal bibasilar atelectasis or infiltrate with bronchitis-on zosyn, will add doxy   Continuous pulse oximetry   covid 19 rapid negative  -breathing tx as needed   -echo      Hypokalemia   K replacement  Received 40 K at 5 :34 AM, will give another two dose of 40 mEQ K, recheck K level after it     Uncontrolled type 2 diabetes mellitus  a1c 13   So far use 45 insulin since admission   Change to lantus 21, 4units short acting ,ssi  Npo now   She needs more than above-will adjust as needed   Diabetic educator - discussed with diabetic educator   Need go home with insulin     Leukocytosis -infection and dehydration from hyperglycemia   Continue abx   Received 2 L ns bolus in ER, will give another ns bolus   Follow-up blood culture and urine culture  General surgery consult-HIDA scan planning      Acute kidney injury, improving  resolved due to prerenal      Thrush  Nystatin mouthwash  HIV testing in process      Noncompliance with medication regimen due to insurance problems  CM and diabetic educator on board for help         Morbid obesityBMI of 39  Aggressive lifestyle modification needed    Subjective: still feel tied, but much better than yesterday. Rn: glucose was high    Review of systems:    General: No fevers or chills. Tired   Cardiovascular: No chest pain or pressure. No palpitations. Pulmonary: No shortness of breath. Gastrointestinal: No nausea, vomiting. Vital signs/Intake and Output:  Visit Vitals  /70   Pulse 88   Temp 98.6 °F (37 °C)   Resp 20   Ht 5' 4\" (1.626 m)   Wt 104.3 kg (230 lb)   SpO2 99%   Breastfeeding No   BMI 39.48 kg/m²     Current Shift:  No intake/output data recorded. Last three shifts:  05/23 1901 - 05/25 0700  In: 2260 [I.V.:2260]  Out: -     Physical Exam:  General: WD, WN. Alert, cooperative, no acute distress    HEENT: NC, Atraumatic. PERRLA, anicteric sclerae. Lungs: CTA Bilaterally. No Wheezing/Rhonchi/Rales. Heart:  Regular  rhythm,  No murmur, No Rubs, No Gallops  Abdomen: Soft, Non distended, Non tender. +Bowel sounds, obese   Extremities: No c/c/e  Psych:   Not anxious or agitated. Neurologic:  No acute neurological deficit.              Labs: Results:       Chemistry Recent Labs     05/25/21  0320 05/24/21  1704   * 571*   * 125*   K 2.8* 3.5   CL 99* 88*   CO2 24 23   BUN 32* 36*   CREA 1.19 1.38*   CA 7.8* 9.1   AGAP 11 14   BUCR 27* 26*   * 934*   TP 5.8* 6.9   ALB 1.5* 1.8*   GLOB 4.3* 5.1*   AGRAT 0.3* 0.4*      CBC w/Diff Recent Labs     05/25/21  0320 05/24/21  1704   WBC 22.0* 24.8*   RBC 2.90* 3.56*   HGB 8.7* 10.8*   HCT 26.4* 32.9*    416   GRANS  --  87*   LYMPH  --  3*   EOS  --  0      Cardiac Enzymes Recent Labs     05/24/21  1704   CPK 14*   CKND1 CALCULATION NOT PERFORMED WHEN RESULT IS BELOW LINEAR LIMIT      Coagulation No results for input(s): PTP, INR, APTT, INREXT, INREXT in the last 72 hours. Lipid Panel No results found for: CHOL, CHOLPOCT, CHOLX, CHLST, CHOLV, 032728, HDL, HDLP, LDL, LDLC, DLDLP, 591654, VLDLC, VLDL, TGLX, TRIGL, TRIGP, TGLPOCT, CHHD, CHHDX   BNP No results for input(s): BNPP in the last 72 hours. Liver Enzymes Recent Labs     05/25/21  0320   TP 5.8*   ALB 1.5*   *      Thyroid Studies No results found for: T4, T3U, TSH, TSHEXT, TSHEXT     Procedures/imaging: see electronic medical records for all procedures/Xrays and details which were not copied into this note but were reviewed prior to creation of Plan    NM LUNG SCAN PERF    Result Date: 5/25/2021  VENTILATION SCAN INDICATION: Hypoxia. COMPARISON: None Available DESCRIPTION: After intravenous administration of 6.6 mCi 99mTc-MAA, perfusion images of the lungs were obtained in multiple projections. Images are correlated with chest radiographic study which demonstrate no evidence of focal pulmonary infiltrate or pleural effusion. The distribution of radiopharmaceutical is within normal limits on perfusion images. There is no evidence of moderate or large subsegmental perfusion defect to indicate the presence of pulmonary embolism. No evidence of pulmonary embolism. CT HEAD WO CONT    Result Date: 5/24/2021  EXAM: CT head INDICATION: Presyncope COMPARISON: None. TECHNIQUE: Axial CT imaging of the head was performed without intravenous contrast. One or more dose reduction techniques were used on this CT: automated exposure control, adjustment of the mAs and/or kVp according to patient size, and iterative reconstruction techniques.   The specific techniques used on this CT exam have been documented in the patient's electronic medical record. Digital Imaging and Communications in Medicine (DICOM) format image data are available to nonaffiliated external healthcare facilities or entities on a secure, media free, reciprocally searchable basis with patient authorization for at least a 12-month period after this study. _______________ FINDINGS: BRAIN AND POSTERIOR FOSSA: The sulci, folia, ventricles and basal cisterns are within normal limits for the patient's age. There is no intracranial hemorrhage, mass effect, or midline shift. There are no areas of abnormal parenchymal attenuation. EXTRA-AXIAL SPACES AND MENINGES: There are no abnormal extra-axial fluid collections. CALVARIUM: Intact. SINUSES: Clear. OTHER: None. _______________     No acute intracranial abnormalities. CT CHEST ABD PELV WO CONT    Result Date: 5/25/2021  EXAM: CT of the chest, abdomen, and pelvis INDICATION: Hypoxia. Pain. COMPARISON: None. TECHNIQUE: Axial CT imaging of the chest, abdomen, and pelvis was performed without intravenous contrast. Multiplanar reformats were generated. Dose reduction techniques used: automated exposure control, adjustment of the mAs and/or kVp according to patient size, and iterative reconstruction techniques. Digital imaging and communications in Medicine (DICOM) format image data are available to nonaffiliated external healthcare facilities or entities on a secure, media free, reciprocally searchable basis with patient authorization for at least 12 months after the study. _______________ FINDINGS: CHEST: LUNGS: There are linear opacities at both lung bases. The lungs are otherwise clear. PLEURA: Normal, with no effusion or pneumothorax. AIRWAY: Normal. MEDIASTINUM: The heart is mildly enlarged. There is minimal pericardial fluid. Given the limitations of cardiac motion, great vessels are unremarkable.  LYMPH NODES: No enlarged lymph nodes. =============== ABDOMEN/PELVIS: LIVER, BILIARY: Liver is normal. No biliary dilation. Gallstones are noted. PANCREAS: There appears to be minimal infiltrative change along the pancreas. SPLEEN: Normal. ADRENALS: Normal. KIDNEYS: Normal. LYMPH NODES: No enlarged lymph nodes. GASTROINTESTINAL TRACT: No bowel dilation or wall thickening. PELVIC ORGANS: Unremarkable. VASCULATURE: Unremarkable. BONES: No acute or aggressive osseous abnormalities identified. OTHER: None. _______________     Linear opacities at the lung bases most consistent with atelectasis and/or scarring. Cholelithiasis. Minimal infiltrative change along the pancreas is a subtle finding. Please correlate with pancreatic enzymes. US ABD LTD    Result Date: 5/24/2021  EXAM: Limited right upper quadrant abdominal ultrasound INDICATION: Elevated alkaline phosphatase and white count COMPARISON: None. TECHNIQUE: Real-time sonography in multiple planes of the [abdomen/right upper quadrant was performed with image documentation. Grayscale, color flow Doppler imaging, and velocity spectral waveform analysis of the portal vein was performed (duplex imaging). ] _______________ FINDINGS: LIVER: The liver [is echogenic suggesting hepatic steatosis, without focal mass.] Liver is enlarged in size, measuring 22.5 cm. Color flow Doppler and velocity spectral waveform analysis of the portal vein shows normal (hepatopedal) direction of flow. BILIARY SYSTEM: No intrahepatic biliary dilatation. Common bile duct is normal in caliber, measuring 4 mm. GALLBLADDER: There is sludge in the gallbladder along with two 4 mm calculi. There is no gallbladder wall thickening or pericholecystic fluid. [Negative sonographic Christine's sign per technologist's report.] RIGHT KIDNEY: Right kidney is normal in size, measuring 12.1 cm in length. No hydronephrosis or renal mass. No visible calculi. PANCREAS: Head and body are unremarkable in appearance though the tail is obscured by overlying bowel gas.  IVC: Visualized portions are normal in caliber and patent. OTHER: No free intraperitoneal fluid. _______________     Sludge and gallstones in the gallbladder without evidence of acute cholecystitis. No acute or focal abnormality otherwise to explain patient's right upper quadrant pain. Enlarged liver with hepatic steatosis    XR CHEST PORT    Result Date: 5/24/2021  EXAM:  AP Portable Chest X-ray 1 view INDICATION: Shortness of breath COMPARISON: None _______________ FINDINGS:  Heart and mediastinal contours are within normal limits for portable radiograph. Minimal bibasilar atelectasis or infiltrate seen. There is peribronchial thickening in the left lower lobe suggesting bronchitis. There are no pleural effusions. No acute osseous findings. ________________      Minimal bibasilar atelectasis or infiltrate with bronchitis.       Brittany Sy MD

## 2021-05-25 NOTE — DIABETES MGMT
Diabetes Patient/Family Education Record    Factors That May Influence Patients Ability to Learn or Comply with Recommendations   []   Language barrier    []   Cultural needs   []   Motivation    []   Cognitive limitation    []   Physical   []   Education    []   Physiological factors   []   Hearing/vision/speaking impairment   []   Latter day beliefs    []   Financial factors   [x]  Other: main obstacle to DM management at this time appears to be related to caring for her young children with special needs but pt verbalized ways she can carry out home DM management. []  No factors identified at this time. Person Instructed:   [x]   Patient   []   Family   []  Other     Preference for Learning:   [x]   Verbal   [x]   Written   []  Demonstration     Level of Comprehension & Competence:    [x]  Good                                      [] Fair                                     []  Poor                             []  Needs Reinforcement   [x]  Teach back completed    Education Component:   [x]  Medication management, including how to administer insulin (if appropriate) and potential medication interactions  Has used Lantus (solostar pens) in the past and knows how to use (knows to prime the pen with each use).     [x]  Nutritional management - [x] Obtained usual meal pattern   [x]   Basic carbohydrate counting  [x]  Plate method  [x]  Limit concentrated sweets and avoid sweetened beverages  [x]  Portion control  [x]    Avoid skipping meals   [x]  Exercise   []  Signs, symptoms, and treatment of hyperglycemia and hypoglycemia   [] Prevention, recognition and treatment of hyperglycemia and hypoglycemia   [x]  Importance of blood glucose monitoring  [x] Blood Glucose targets   [x]   Provided patient with blood glucose meter - Contour Next EZ meter,  10 test strips and 10 lancets  []  Has glucometer and supplies at home - has a meter at home but states it's old and not sure if it is working   []  Instruction on use of the blood glucose meter and recommended monitoring schedule   []  Discuss the importance of HbA1C monitoring. Patients A1c is 13.4  %.  This is equivalent to average glucose of 338 mg/dl for the past 2-3 months.   []  Sick day guidelines   []  Proper use and disposal of lancets, needles, syringes or insulin pens (if appropriate)   []  Potential long-term complications (retinopathy, kidney disease, neuropathy, foot care)   [] Information about whom to contact in case of emergency or for more information    [x]  Goal:  Patient/family will demonstrate understanding of Diabetes Self- Management Skills by: (date) _6/15/21______  Plan for post-discharge education or self-management support:    [] Outpatient class schedule provided            [] Patient Declined    [] Scheduled for outpatient classes (date) _______    [x] Written information provided  Verify: [x] Prior to admission Diabetes medications    Does patient understand how diabetes medications work? _______yes_____________________  Does patient have difficulty obtaining diabetes medications or testing supplies? no sure - has Medicaid per pt and chart_____________     Valentino Skene, RD, [facility]  Diabetes and Glycemic Control   Office:  [phone]  Pager:  [phone]

## 2021-05-25 NOTE — PROGRESS NOTES
TRANSFER - IN REPORT:    Verbal report received from [facility]) on [facility]  being received from ED(unit) for routine progression of care      Report consisted of patients Situation, Background, Assessment and   Recommendations(SBAR). Information from the following report(s) SBAR, Kardex, ED Summary, Recent Results, Med Rec Status and Cardiac Rhythm NSR was reviewed with the receiving nurse. Opportunity for questions and clarification was provided. Assessment completed upon patients arrival to unit and care assumed. Admission assessment complete. Patient mentioned pain in her legs due to neuropathy and tylenol was given. Patient is resting quietly with eyes closed and chest rising and falling evenly. 1583 Shift reassessment complete. No sign or complaints of pain from patient. Patient is resting quietly with eyes wide open and chest rising and falling evenly. 0410 Critical lab called with patients potassium level at a 2.8. Hospitalist was reached and potassium PO was prescribed. 0530 Shift reassessment complete. Patient is resting quietly with eyes closed and chest rising and falling evenly. No signs of discomfort or pain. 3 [place] Night shift chart check complete    Bedside and Verbal shift change report given to Amina (oncoming nurse) by Izabela Escalante RN (offgoing nurse). Report included the following information SBAR, Kardex, ED Summary, OR Summary, MAR, Recent Results, Med Rec Status and Cardiac Rhythm NSR.

## 2021-05-25 NOTE — H&P
History & Physical    Patient: Vikash Nichole MRN: [id number]  CSN: [id number]    YOB: 1980  Age: 38 y.o. Sex: female      DOA: 5/24/2021  Primary Care Provider:  Bryan Hemphill MD      Assessment/Plan     Patient Active Problem List   Diagnosis Code    Leukocytosis D72.829    Hypoxia R09.02    Type II diabetes mellitus with complication, uncontrolled ([facility] [address] E11.8, E11.65    Noncompliance with medication regimen Z91.14    Failure of outpatient treatment Z78.9    Acute kidney injury ([address] N17.9   [place] B37.0    Morbid obesity ([facility] [address] E66.01    Cholelithiasis K80.23     18-year-old female with morbid obesity and uncontrolled type 2 diabetes mellitus with neuropathy who has been off her diabetic medication for the last 2 years for insurance reasons is admitted for leukocytosis, hypoxia, failure of outpatient treatment, uncontrolled hyperglycemia, and acute kidney injury. Ideally, she needs CT imaging with contrast but will defer this for now due to her resolving acute kidney injury. She had a nuclear medicine VQ scan which ruled out PE and a right upper quadrant ultrasound which showed cholelithiasis but no cholecystitis. I have ordered noncontrast scans of the chest as well as CT abdomen pelvis to try to clarify the cause of her leukocytosis. I am suspicious for pneumonia but also want to evaluate the gallbladder and kidneys better. Hypoxia, PE ruled out. She was given 1 dose of treatment dose anticoagulation while the VQ scan was pending.   Telemetry  Nasal cannula oxygen  Continuous pulse oximetry    Leukocytosis with failure of outpatient treatmentlikely due to pneumonia but also want to evaluate renal or gallbladder etiologies  Zosyn for broad-spectrum antibiotic coverage for the above etiologies  Follow-up blood culture and urine culture  --General surgery consult (HIDA scan ordered as recommended)    Acute kidney injury, improving  Continue IV hydration and trend creatinine  Hold lisinopril    Thrush  Nystatin mouthwash  Check HIV    Noncompliance with medication regimen due to insurance problemswas off medications for 2 years but was restarted on Victoza as monotherapy. She may do better on basal and bolus insulin depending on what her hemoglobin A1c is. --Diabetes management consult     Uncontrolled type 2 diabetes mellitusno anion gap and I suspect ketones are due to dehydration  N. p.o. until blood sugar is below 300   Start Lantus 15 units insulin tonight  IV insulin 10 units x 1  Sliding scale insulin  --Follow up hemoglobin A1C    Morbid obesityBMI of 39  Aggressive lifestyle modification needed    Full code    Prophylaxis: Pepcid p.o., Lovenox SQ    Estimated length of stay : 3 nights    MD Laron Jungurnist  ----------------------------------------------------------------------------------------------------------------------------------------------------------------------------------------------------------------  CC: feeling ill, short of breath       HPI:     Mary Shelley is a 38 y.o. female with morbid obesity and uncontrolled type 2 diabetes mellitus with neuropathy presents with worsening shortness of breath, fatigue, and feeling poorly. She has had multiple ED visits to [address] . She left [place] from [address] [place] [address] on 5/17. Her creatinine was 2.6 at that time with a white blood cell count of 21. She was discharged with metronidazole and cefdinir for presumed UTI but her urine culture was negative. She reports not having an appetite for the last 8 days but she denies vomiting or diarrhea. She takes care of her 2 children with special needs who are aged 3 and 11 and was continuing to worsen at home. She had been off of her diabetic medications for the last 2 years but her PCP started her on Victoza monotherapy 2 weeks ago.   About 2 days after she started the Victoza she started to feel poorly but does not regularly check her blood sugars. Past Medical History:   Diagnosis Date    Diabetes ([facility] [address]     Hypertension     Hypothyroidism     Morbid obesity ([facility] [address]     Neuropathy     Thrush        History reviewed. No pertinent surgical history. Family History   Problem Relation Age of Onset    Asthma Mother     Hypertension Mother     Asthma Brother     Diabetes Maternal Grandmother     Hypertension Maternal Grandmother     Cancer Paternal Grandfather        Social History     Socioeconomic History    Marital status: SINGLE     Spouse name: Not on file    Number of children: Not on file    Years of education: Not on file    Highest education level: Not on file   Tobacco Use    Smoking status: Never Smoker    Smokeless tobacco: Never Used   Substance and Sexual Activity    Alcohol use: Never    Drug use: Never   Other Topics Concern     Social Determinants of Health     Financial Resource Strain:     Difficulty of Paying Living Expenses:    Food Insecurity:     Worried About Running Out of Food in the Last Year:     Ran Out of Food in the Last Year:    Transportation Needs:     Lack of Transportation (Medical):  Lack of Transportation (Non-Medical):    Physical Activity:     Days of Exercise per Week:     Minutes of Exercise per Session:    Stress:     Feeling of Stress :    Social Connections:     Frequency of Communication with Friends and Family:     Frequency of Social Gatherings with Friends and Family:     Attends [employer] Services:     Active Member of Clubs or Organizations:     Attends Club or Organization Meetings:     Marital Status:        Prior to Admission medications    Not on File       Allergies   Allergen Reactions    Codeine Itching    Gabapentin Other (comments)     \"Makes my skin crawl\"    Lyrica [Pregabalin] Other (comments)     \"Makes my skin crawl\"       Review of Systems  Gen: No fever, chills, +malaise   Heent: No headache, rhinorrhea, sore throat, +thrush.    Heart: No chest pain, palpitations, +LENTZ, no pnd, or orthopnea. Resp: No cough, hemoptysis, wheezing; +shortness of breath. GI: No nausea, vomiting, diarrhea, constipation, melena or hematochezia. : No urinary obstruction, dysuria or hematuria. Derm: No rash, new skin lesion or pruritis. Musc/skeletal: no bone or joint complaints. Vasc: No edema, cyanosis or claudication. Endo: No heat/cold intolerance, no polyuria, polydipsia or polyphagia. Neuro: No unilateral weakness, numbness, tingling. No seizures. Heme: No easy bruising or bleeding. Physical Exam:     Physical Exam:  Visit Vitals  /72   Pulse 95   Temp 98.5 °F (36.9 °C)   Resp 22   Ht 5' 4\" (1.626 m)   Wt 104.3 kg (230 lb)   LMP 2021   SpO2 99%   Breastfeeding No   BMI 39.48 kg/m²    O2 Flow Rate (L/min): 2 l/min O2 Device: Nasal cannula    Temp (24hrs), Av.8 °F (36.6 °C), Min:97.3 °F (36.3 °C), Max:98.5 °F (36.9 °C)    1901 -  0700  In: 8938 [I.V.:1260]  Out: -     07 - 1900  In: 1000 [I.V.:1000]  Out: -     General:  Awake, cooperative, ill-appearing, no distress. Head:  Normocephalic, without obvious abnormality, atraumatic. Eyes:  Conjunctivae/corneas clear, sclera anicteric. Throat: Lips, mucosa, and tongue normal. Thrush noted on tongue. Neck: Supple, symmetrical, trachea midline. Lungs:   Rales in left lung base, no wheezing. Heart:  Regular rate and rhythm, S1, S2 normal, no murmur, click, rub or gallop. Abdomen: Soft, non-tender. Bowel sounds normal. No masses,  No organomegaly. Extremities: Extremities normal, atraumatic, no cyanosis or edema. Capillary refill normal.   Pulses: 2+ and symmetric all extremities. Skin: Skin color pink, turgor normal. No rashes or lesions   Neurologic: No focal motor or sensory deficit. Labs Reviewed: All lab results for the last 24 hours reviewed.   Recent Results (from the past 24 hour(s))   CBC WITH AUTOMATED DIFF    Collection Time: 05/24/21  5:04 PM   Result Value Ref Range    WBC 24.8 (H) 4.6 - 13.2 K/uL    RBC 3.56 (L) 4.20 - 5.30 M/uL    HGB 10.8 (L) 12.0 - 16.0 g/dL    HCT 32.9 (L) 35.0 - 45.0 %    MCV 92.4 74.0 - 97.0 FL    MCH 30.3 24.0 - 34.0 PG    MCHC 32.8 31.0 - 37.0 g/dL    RDW 13.8 11.6 - 14.5 %    PLATELET 774 929 - 759 K/uL    MPV 11.2 9.2 - 11.8 FL    NEUTROPHILS 87 (H) 40 - 73 %    BAND NEUTROPHILS 6 (H) 0 - 5 %    LYMPHOCYTES 3 (L) 21 - 52 %    MONOCYTES 4 3 - 10 %    EOSINOPHILS 0 0 - 5 %    BASOPHILS 0 0 - 2 %    ABS. NEUTROPHILS 23.1 (H) 1.8 - 8.0 K/UL    ABS. LYMPHOCYTES 0.7 (L) 0.9 - 3.6 K/UL    ABS. MONOCYTES 1.0 0.05 - 1.2 K/UL    ABS. EOSINOPHILS 0.0 0.0 - 0.4 K/UL    ABS. BASOPHILS 0.0 0.0 - 0.1 K/UL    DF MANUAL      RBC COMMENTS NORMOCYTIC, NORMOCHROMIC     METABOLIC PANEL, COMPREHENSIVE    Collection Time: 05/24/21  5:04 PM   Result Value Ref Range    Sodium 125 (L) 136 - 145 mmol/L    Potassium 3.5 3.5 - 5.5 mmol/L    Chloride 88 (L) 100 - 111 mmol/L    CO2 23 21 - 32 mmol/L    Anion gap 14 3.0 - 18 mmol/L    Glucose 571 (HH) 74 - 99 mg/dL    BUN 36 (H) 7.0 - 18 MG/DL    Creatinine 1.38 (H) 0.6 - 1.3 MG/DL    BUN/Creatinine ratio 26 (H) 12 - 20      GFR est AA 51 (L) >60 ml/min/1.73m2    GFR est non-AA 42 (L) >60 ml/min/1.73m2    Calcium 9.1 8.5 - 10.1 MG/DL    Bilirubin, total 3.5 (H) 0.2 - 1.0 MG/DL    ALT (SGPT) 27 13 - 56 U/L    AST (SGOT) 37 10 - 38 U/L    Alk.  phosphatase 934 (H) 45 - 117 U/L    Protein, total 6.9 6.4 - 8.2 g/dL    Albumin 1.8 (L) 3.4 - 5.0 g/dL    Globulin 5.1 (H) 2.0 - 4.0 g/dL    A-G Ratio 0.4 (L) 0.8 - 1.7     CARDIAC PANEL,(CK, CKMB & TROPONIN)    Collection Time: 05/24/21  5:04 PM   Result Value Ref Range    CK - MB <1.0 <3.6 ng/ml    CK-MB Index  0.0 - 4.0 %     CALCULATION NOT PERFORMED WHEN RESULT IS BELOW LINEAR LIMIT    CK 14 (L) 26 - 192 U/L    Troponin-I, QT 0.09 (H) 0.0 - 0.045 NG/ML   EKG, 12 LEAD, INITIAL    Collection Time: 05/24/21  5:08 PM   Result Value Ref Range Ventricular Rate 103 BPM    Atrial Rate 103 BPM    P-R Interval 168 ms    QRS Duration 112 ms    Q-T Interval 366 ms    QTC Calculation (Bezet) 479 ms    Calculated P Axis 7 degrees    Calculated R Axis -27 degrees    Calculated T Axis 116 degrees    Diagnosis       Sinus tachycardia with premature supraventricular complexes with frequent   premature ventricular complexes  Incomplete left bundle branch block  Left ventricular hypertrophy with repolarization abnormality  Abnormal ECG  No previous ECGs available     POC LACTIC ACID    Collection Time: 05/24/21  6:00 PM   Result Value Ref Range    Lactic Acid (POC) 1.09 0.40 - 2.00 mmol/L   URINALYSIS W/ RFLX MICROSCOPIC    Collection Time: 05/24/21  7:00 PM   Result Value Ref Range    Color DARK YELLOW      Appearance CLOUDY      Specific gravity 1.024 1.005 - 1.030      pH (UA) 5.5 5.0 - 8.0      Protein 30 (A) NEG mg/dL    Glucose >1,000 (A) NEG mg/dL    Ketone 15 (A) NEG mg/dL    Bilirubin SMALL (A) NEG      Blood SMALL (A) NEG      Urobilinogen 1.0 0.2 - 1.0 EU/dL    Nitrites Negative NEG      Leukocyte Esterase TRACE (A) NEG     HCG URINE, QL    Collection Time: 05/24/21  7:00 PM   Result Value Ref Range    HCG urine, QL Negative NEG     URINE MICROSCOPIC ONLY    Collection Time: 05/24/21  7:00 PM   Result Value Ref Range    WBC 4 to 10 0 - 5 /hpf    RBC 0 to 3 0 - 5 /hpf    Epithelial cells 3+ 0 - 5 /lpf    Bacteria FEW (A) NEG /hpf   GLUCOSE, POC    Collection Time: 05/24/21  7:18 PM   Result Value Ref Range    Glucose (POC) >600 (HH) 70 - 110 mg/dL   GLUCOSE, POC    Collection Time: 05/24/21  7:19 PM   Result Value Ref Range    Glucose (POC) 567 (HH) 70 - 110 mg/dL   BLOOD GAS, ARTERIAL POC    Collection Time: 05/24/21  7:42 PM   Result Value Ref Range    Device: ROOM AIR      FIO2 (POC) 21 %    pH (POC) 7.42 7.35 - 7.45      pCO2 (POC) 31.5 (L) 35.0 - 45.0 MMHG    pO2 (POC) 68 (L) 80 - 100 MMHG    HCO3 (POC) 20.5 (L) 22 - 26 MMOL/L    sO2 (POC) 94.4 92 - 97 % Base deficit (POC) 3.4 mmol/L    Set Rate 22 bpm    Allens test (POC) NOT APPLICABLE      Site RIGHT RADIAL      Patient temp. 97.7      Specimen type (POC) ARTERIAL      Performed by Bernarda Benavides    GLUCOSE, POC    Collection Time: 05/24/21  8:13 PM   Result Value Ref Range    Glucose (POC) 533 (HH) 70 - 110 mg/dL   GLUCOSE, POC    Collection Time: 05/24/21  8:14 PM   Result Value Ref Range    Glucose (POC) 586 (HH) 70 - 110 mg/dL   GLUCOSE, POC    Collection Time: 05/24/21  9:16 PM   Result Value Ref Range    Glucose (POC) 501 (HH) 70 - 110 mg/dL   GLUCOSE, POC    Collection Time: 05/24/21  9:17 PM   Result Value Ref Range    Glucose (POC) 501 (HH) 70 - 110 mg/dL   GLUCOSE, POC    Collection Time: 05/24/21 10:35 PM   Result Value Ref Range    Glucose (POC) 493 (HH) 70 - 110 mg/dL     Results  NM LUNG PERFUSION W VENT (Accession [id number]) (Order [id number])  Allergies     Not Specified: Codeine;  Gabapentin;  Lyrica [Pregabalin]   Exam Information    Status Exam Begun  Exam Ended    Final [99] 5/24/2021 23:30 5/24/2021 11:53 PM [id number] 11:53 PM   Result Information    Status: Final result (Exam End: 5/24/2021 23:53) Provider Status: Open   Study Result    Narrative & Impression   VENTILATION SCAN      INDICATION: Hypoxia.     COMPARISON: None Available     DESCRIPTION: After intravenous administration of 6.6 mCi 99mTc-MAA, perfusion  images of the lungs were obtained in multiple projections. Images are  correlated with chest radiographic study which demonstrate no evidence of focal  pulmonary infiltrate or pleural effusion.       The distribution of radiopharmaceutical is within normal limits on perfusion  images. There is no evidence of moderate or large subsegmental perfusion defect  to indicate the presence of pulmonary embolism.       IMPRESSION     No evidence of pulmonary embolism.        Results  CT HEAD WO CONT (Accession [id number]) (Order [id number])  Allergies     Not Specified: Codeine; Gabapentin;  Lyrica [Pregabalin]   Exam Information    Status Exam Begun  Exam Ended    Final [99] 5/24/2021 18:10 5/24/2021  6:15 PM [id number]  6:15 PM   Result Information    Status: Final result (Exam End: 5/24/2021 18:15) Provider Status: Open   Study Result    Narrative & Impression   EXAM: CT head     INDICATION: Presyncope     COMPARISON: None.     TECHNIQUE: Axial CT imaging of the head was performed without intravenous  contrast. One or more dose reduction techniques were used on this CT: automated  exposure control, adjustment of the mAs and/or kVp according to patient size,  and iterative reconstruction techniques. The specific techniques used on this  CT exam have been documented in the patient's electronic medical record. Digital  Imaging and Communications in Medicine (DICOM) format image data are available  to nonaffiliated external healthcare facilities or entities on a secure, media  free, reciprocally searchable basis with patient authorization for at least a  12-month period after this study.     _______________     FINDINGS:     BRAIN AND POSTERIOR FOSSA: The sulci, folia, ventricles and basal cisterns are  within normal limits for the patient's age. There is no intracranial hemorrhage,  mass effect, or midline shift. There are no areas of abnormal parenchymal  attenuation.     EXTRA-AXIAL SPACES AND MENINGES: There are no abnormal extra-axial fluid  collections.     CALVARIUM: Intact.     SINUSES: Clear.     OTHER: None.     _______________     IMPRESSION     No acute intracranial abnormalities.      Results  XR CHEST PORT (Accession [id number]) (Order [id number])  Allergies     Not Specified: Codeine;  Gabapentin;  Lyrica [Pregabalin]   Exam Information    Status Exam Begun  Exam Ended    Final [99] 5/24/2021 16:49 5/24/2021  4:58 PM [id number]  4:58 PM   Result Information    Status: Final result (Exam End: 5/24/2021 16:58) Provider Status: Open   Study Result    Narrative & Impression   EXAM:  AP Portable Chest X-ray 1 view      INDICATION: Shortness of breath     COMPARISON: None     _______________     FINDINGS:  Heart and mediastinal contours are within normal limits for portable  radiograph. Minimal bibasilar atelectasis or infiltrate seen. There is  peribronchial thickening in the left lower lobe suggesting bronchitis. There are  no pleural effusions. No acute osseous findings.     ________________        IMPRESSION  Minimal bibasilar atelectasis or infiltrate with bronchitis. Narrative Performed At   [facility] DESCRIPTION:    CT THORAX WO CONTRAST        ACCESSION:    [id number]        COMPLETED DATE/TIME:    5/17/2021 4:18 pm        REASON FOR EXAM:    fever, white count, chest pain, negative cxr        COMPARISON:    Chest x-ray May 17, 2021        TECHNIQUE:    Axial noncontrast enhanced CT of the thorax is obtained.  Coronal and sagittal reconstructions are provided.        All CT scans performed at [facility] facilities are performed using dose optimization techniques as appropriate to exam including the following: Automated exposure control, adjustment of the mA or kVp according to patient size, and the use of iterative reconstruction techniques.        REPORT:    Lungs/Pleura: Platelike atelectatic change at the bilateral lung bases.  No lobar consolidation.  No significant effusion.  No pneumothorax.            Mediastinum/Lymph Nodes:  There are no enlarged thoracic lymph nodes.            Heart and Aorta: Mild cardiomegaly.  No pericardial effusion.  There is a normal caliber left sided aortic arch.   Few scattered coronary calcifications.            Osseous Structures: No acute osseous abnormality.            Upper Abdomen: No acute findings in the upper abdomen. Hepatic steatosis.  Small calcified granuloma in the liver.       FLUENCY       CT Thorax WO Contrast (05/17/2021 4:18 PM EDT)   Procedure Note   Interface, Incoming Imaging Results Fluency - 05/17/2021 4:31 PM EDT  Formatting of this note might be different from the original.  EXAM DESCRIPTION:  CT THORAX WO CONTRAST    ACCESSION:  [id number]    COMPLETED DATE/TIME:  5/17/2021 4:18 pm    REASON FOR EXAM:  fever, white count, chest pain, negative cxr    COMPARISON:  Chest x-ray May 17, 2021    TECHNIQUE:  Axial noncontrast enhanced CT of the thorax is obtained. Coronal and sagittal reconstructions are provided. All CT scans performed at [facility] facilities are performed using dose optimization techniques as appropriate to exam including the following: Automated exposure control, adjustment of the mA or kVp according to patient size, and the use of iterative reconstruction techniques. REPORT:  Lungs/Pleura: Platelike atelectatic change at the bilateral lung bases. No lobar consolidation. No significant effusion. No pneumothorax. Mediastinum/Lymph Nodes: There are no enlarged thoracic lymph nodes. Heart and Aorta: Mild cardiomegaly. No pericardial effusion. There is a normal caliber left sided aortic arch. Few scattered coronary calcifications. Osseous Structures: No acute osseous abnormality. Upper Abdomen: No acute findings in the upper abdomen. Hepatic steatosis. Small calcified granuloma in the liver. IMPRESSION:  1. Platelike atelectasis at the lung bases. No large regions of consolidation. No effusion. 2. Mild cardiomegaly. 3. Hepatic steatosis.      Results  US ABD LTD (Accession [id number]) (Order [id number])  Allergies     Not Specified: Codeine;  Gabapentin;  Lyrica [Pregabalin]   Exam Information    Status Exam Begun  Exam Ended    Final [99] 5/24/2021 21:30 5/24/2021 10:05 PM [id number] 10:05 PM   Result Information    Status: Final result (Exam End: 5/24/2021 22:05) Provider Status: Open   Study Result    Narrative & Impression   EXAM: Limited right upper quadrant abdominal ultrasound     INDICATION: Elevated alkaline phosphatase and white count     COMPARISON: None.     TECHNIQUE: Real-time sonography in multiple planes of the [abdomen/right upper  quadrant was performed with image documentation. Grayscale, color flow Doppler  imaging, and velocity spectral waveform analysis of the portal vein was  performed (duplex imaging). ]     _______________     FINDINGS:     LIVER: The liver [is echogenic suggesting hepatic steatosis, without focal  mass.] Liver is enlarged in size, measuring 22.5 cm. Color flow Doppler and  velocity spectral waveform analysis of the portal vein shows normal  (hepatopedal) direction of flow.     BILIARY SYSTEM: No intrahepatic biliary dilatation. Common bile duct is normal  in caliber, measuring 4 mm.     GALLBLADDER: There is sludge in the gallbladder along with two 4 mm calculi. There is no gallbladder wall thickening or pericholecystic fluid. [Negative  sonographic Christine's sign per technologist's report.]     RIGHT KIDNEY: Right kidney is normal in size, measuring 12.1 cm in length. No  hydronephrosis or renal mass.  No visible calculi.     PANCREAS: Head and body are unremarkable in appearance though the tail is  obscured by overlying bowel gas.     IVC: Visualized portions are normal in caliber and patent.     OTHER: No free intraperitoneal fluid.     _______________     IMPRESSION     Sludge and gallstones in the gallbladder without evidence of acute  cholecystitis.     No acute or focal abnormality otherwise to explain patient's right upper  quadrant pain.     Enlarged liver with hepatic steatosis

## 2021-05-25 NOTE — PROGRESS NOTES
0730: assumed patient from off going nurse KENZIE Plaza RN     Patient had an uneventful shift summary.

## 2021-05-25 NOTE — PROGRESS NOTES
Problem: Diabetes Maintenance:Admission  Goal: Activity/Safety  5/25/2021 1159 by Edward Luna  Outcome: Progressing Towards Goal  5/25/2021 1159 by Edward Luna  Outcome: Progressing Towards Goal  Goal: Diagnostic Tests/Procedures  5/25/2021 1159 by Daisy Yusuf  Outcome: Progressing Towards Goal  5/25/2021 1159 by Daisy Yusuf  Outcome: Progressing Towards Goal  Goal: Nutrition  5/25/2021 1159 by Daisy Yusuf  Outcome: Progressing Towards Goal  5/25/2021 1159 by Edward Luna  Outcome: Progressing Towards Goal  Goal: Medications  5/25/2021 1159 by Daisy Yusuf  Outcome: Progressing Towards Goal  5/25/2021 1159 by Edward Luna  Outcome: Progressing Towards Goal  Goal: Treatments/Interventions/Procedures  5/25/2021 1159 by Edward Luna  Outcome: Progressing Towards Goal  5/25/2021 1159 by Edward Luna  Outcome: Progressing Towards Goal     Problem: Diabetes Maintenance:Ongoing  Goal: Activity/Safety  5/25/2021 1159 by Edward Luna  Outcome: Progressing Towards Goal  5/25/2021 1159 by Daisy Yusuf  Outcome: Progressing Towards Goal  Goal: Nutrition  5/25/2021 1159 by Daisy Yusuf  Outcome: Progressing Towards Goal  5/25/2021 1159 by Edward Luna  Outcome: Progressing Towards Goal  Goal: Medications  5/25/2021 1159 by Daisy Yusuf  Outcome: Progressing Towards Goal  5/25/2021 1159 by Edward Luna  Outcome: Progressing Towards Goal  Goal: Treatments/Interventsions/Procedures  5/25/2021 1159 by Edward Luna  Outcome: Progressing Towards Goal  5/25/2021 1159 by Edward Luna  Outcome: Progressing Towards Goal  Goal: *Blood Glucose 80 to 180 md/dl  5/25/2021 1159 by Edward Luna  Outcome: Progressing Towards Goal  5/25/2021 1159 by Edward Luna  Outcome: Progressing Towards Goal     Problem: Diabetes Maintenance:Discharge Outcomes  Goal: *Describes follow-up/return visits to physicians  5/25/2021 1159 by Daisy Yusuf  Outcome: Progressing Towards Goal  5/25/2021 1159 by Edward Luna  Outcome: Progressing Towards Goal  Goal: *Blood glucose at patient's target range or approaching  5/25/2021 1159 by Edward Luna  Outcome: Progressing Towards Goal  5/25/2021 1159 by Edward Luna  Outcome: Progressing Towards Goal  Goal: *Aware of nutrition guidelines  5/25/2021 1159 by Edward Luna  Outcome: Progressing Towards Goal  5/25/2021 1159 by Edward Luna  Outcome: Progressing Towards Goal  Goal: *Verbalizes information about medication  Description: Verbalizes name, dosage, time, side effects, and number of days to  continue medications. 5/25/2021 1159 by Viola Patel  Outcome: Progressing Towards Goal  5/25/2021 1159 by Edward Luna  Outcome: Progressing Towards Goal  Goal: *Describes goals, rules, symptoms, and treatments  Description: Describes blood glucose goals, monitoring, sick day rules,  hypo/hyperglycemia prevention, symptoms, and treatment  5/25/2021 1159 by Viola Patel  Outcome: Progressing Towards Goal  5/25/2021 1159 by Edward Luna  Outcome: Progressing Towards Goal  Goal: *Describes available outpatient diabetes resources and support systems  5/25/2021 1159 by Viola Patel  Outcome: Progressing Towards Goal  5/25/2021 1159 by Edward Luna  Outcome: Progressing Towards Goal     Problem: Falls - Risk of  Goal: *Absence of Falls  Description: Document Annmarie Parson Fall Risk and appropriate interventions in the flowsheet.   5/25/2021 1159 by Viola Patel  Outcome: Progressing Towards Goal  Note: Fall Risk Interventions:            Medication Interventions: Teach patient to arise slowly, Patient to call before getting OOB                5/25/2021 1159 by Viola Patel  Outcome: Progressing Towards Goal  Note: Fall Risk Interventions:            Medication Interventions: Teach patient to arise slowly, Patient to call before getting OOB                   Problem: Patient Education: Go to Patient Education Activity  Goal: Patient/Family Education  5/25/2021 1159 by Ezequiel Chacko  Outcome: Progressing Towards Goal  5/25/2021 1159 by Ezequiel Chacko  Outcome: Progressing Towards Goal     Problem: General Medical Care Plan  Goal: *Vital signs within specified parameters  5/25/2021 1159 by Edward Luna  Outcome: Progressing Towards Goal  5/25/2021 1159 by Edward Luna  Outcome: Progressing Towards Goal  Goal: *Labs within defined limits  Outcome: Progressing Towards Goal  Goal: *Absence of infection signs and symptoms  Outcome: Progressing Towards Goal  Goal: *Optimal pain control at patient's stated goal  Outcome: Progressing Towards Goal  Goal: *Skin integrity maintained  Outcome: Progressing Towards Goal  Goal: *Fluid volume balance  Outcome: Progressing Towards Goal  Goal: *Optimize nutritional status  Outcome: Progressing Towards Goal  Goal: *Anxiety reduced or absent  Outcome: Progressing Towards Goal  Goal: *Progressive mobility and function (eg: ADL's)  Outcome: Progressing Towards Goal     Problem: Patient Education: Go to Patient Education Activity  Goal: Patient/Family Education  Outcome: Progressing Towards Goal     Problem: Diabetes Self-Management  Goal: *Disease process and treatment process  Description: Define diabetes and identify own type of diabetes; list 3 options for treating diabetes. Outcome: Progressing Towards Goal  Goal: *Incorporating nutritional management into lifestyle  Description: Describe effect of type, amount and timing of food on blood glucose; list 3 methods for planning meals. Outcome: Progressing Towards Goal  Goal: *Incorporating physical activity into lifestyle  Description: State effect of exercise on blood glucose levels. Outcome: Progressing Towards Goal  Goal: *Developing strategies to promote health/change behavior  Description: Define the ABC's of diabetes; identify appropriate screenings, schedule and personal plan for screenings.   Outcome: Progressing Towards Goal  Goal: *Using medications safely  Description: State effect of diabetes medications on diabetes; name diabetes medication taking, action and side effects. Outcome: Progressing Towards Goal  Goal: *Monitoring blood glucose, interpreting and using results  Description: Identify recommended blood glucose targets  and personal targets. Outcome: Progressing Towards Goal  Goal: *Prevention, detection, treatment of acute complications  Description: List symptoms of hyper- and hypoglycemia; describe how to treat low blood sugar and actions for lowering  high blood glucose level. Outcome: Progressing Towards Goal  Goal: *Prevention, detection and treatment of chronic complications  Description: Define the natural course of diabetes and describe the relationship of blood glucose levels to long term complications of diabetes.   Outcome: Progressing Towards Goal  Goal: *Developing strategies to address psychosocial issues  Description: Describe feelings about living with diabetes; identify support needed and support network  Outcome: Progressing Towards Goal  Goal: *Insulin pump training  Outcome: Progressing Towards Goal  Goal: *Sick day guidelines  Outcome: Progressing Towards Goal  Goal: *Patient Specific Goal (EDIT GOAL, INSERT TEXT)  Outcome: Progressing Towards Goal     Problem: Patient Education: Go to Patient Education Activity  Goal: Patient/Family Education  Outcome: Progressing Towards Goal

## 2021-05-25 NOTE — PROGRESS NOTES
Problem: Diabetes Maintenance:Admission  Goal: Activity/Safety  Outcome: Progressing Towards Goal     Problem: Diabetes Maintenance:Admission  Goal: Diagnostic Tests/Procedures  Outcome: Progressing Towards Goal     Problem: Diabetes Maintenance:Admission  Goal: Nutrition  Outcome: Progressing Towards Goal     Problem: Diabetes Maintenance:Admission  Goal: Medications  Outcome: Progressing Towards Goal     Problem: Diabetes Maintenance:Admission  Goal: Treatments/Interventions/Procedures  Outcome: Progressing Towards Goal     Problem: Diabetes Maintenance:Ongoing  Goal: Activity/Safety  Outcome: Progressing Towards Goal     Problem: Diabetes Maintenance:Ongoing  Goal: Nutrition  Outcome: Progressing Towards Goal     Problem: Diabetes Maintenance:Ongoing  Goal: Medications  Outcome: Progressing Towards Goal     Problem: Diabetes Maintenance:Ongoing  Goal: Treatments/Interventsions/Procedures  Outcome: Progressing Towards Goal     Problem: Diabetes Maintenance:Ongoing  Goal: *Blood Glucose 80 to 180 md/dl  Outcome: Progressing Towards Goal     Problem: Diabetes Maintenance:Discharge Outcomes  Goal: *Describes follow-up/return visits to physicians  Outcome: Progressing Towards Goal     Problem: Diabetes Maintenance:Discharge Outcomes  Goal: *Blood glucose at patient's target range or approaching  Outcome: Progressing Towards Goal     Problem: Diabetes Maintenance:Discharge Outcomes  Goal: *Aware of nutrition guidelines  Outcome: Progressing Towards Goal     Problem: Diabetes Maintenance:Discharge Outcomes  Goal: *Verbalizes information about medication  Description: Verbalizes name, dosage, time, side effects, and number of days to  continue medications.   Outcome: Progressing Towards Goal     Problem: Diabetes Maintenance:Discharge Outcomes  Goal: *Describes goals, rules, symptoms, and treatments  Description: Describes blood glucose goals, monitoring, sick day rules,  hypo/hyperglycemia prevention, symptoms, and treatment  Outcome: Progressing Towards Goal     Problem: Diabetes Maintenance:Discharge Outcomes  Goal: *Describes available outpatient diabetes resources and support systems  Outcome: Progressing Towards Goal     Problem: Falls - Risk of  Goal: *Absence of Falls  Description: Document Janie Fall Risk and appropriate interventions in the flowsheet.   Outcome: Progressing Towards Goal  Note: Fall Risk Interventions:            Medication Interventions: Bed/chair exit alarm, Patient to call before getting OOB, Teach patient to arise slowly                   Problem: Patient Education: Go to Patient Education Activity  Goal: Patient/Family Education  Outcome: Progressing Towards Goal     Problem: General Medical Care Plan  Goal: *Vital signs within specified parameters  Outcome: Progressing Towards Goal     Problem: General Medical Care Plan  Goal: *Labs within defined limits  Outcome: Progressing Towards Goal     Problem: General Medical Care Plan  Goal: *Absence of infection signs and symptoms  Outcome: Progressing Towards Goal     Problem: General Medical Care Plan  Goal: *Optimal pain control at patient's stated goal  Outcome: Progressing Towards Goal     Problem: General Medical Care Plan  Goal: *Skin integrity maintained  Outcome: Progressing Towards Goal     Problem: General Medical Care Plan  Goal: *Fluid volume balance  Outcome: Progressing Towards Goal     Problem: General Medical Care Plan  Goal: *Optimize nutritional status  Outcome: Progressing Towards Goal     Problem: General Medical Care Plan  Goal: *Anxiety reduced or absent  Outcome: Progressing Towards Goal     Problem: General Medical Care Plan  Goal: *Progressive mobility and function (eg: ADL's)  Outcome: Progressing Towards Goal     Problem: Patient Education: Go to Patient Education Activity  Goal: Patient/Family Education  Outcome: Progressing Towards Goal

## 2021-05-25 NOTE — PROGRESS NOTES
Pt had VQ scan last night around 1130pm. Due to same isotope being used and previous contrast being filtered through the liver, HIDA scan will have to be delayed until tomorrow at noon. Spoke with nurse and informed her to keep pt NPO after midnight and no narcotic pain meds.

## 2021-05-25 NOTE — CONSULTS
Consult Note    Patient: Loulou Barber MRN: [id number]  CSN: [id number]    YOB: 1980  Age: 38 y.o. Sex: female    DOA: 5/24/2021 LOS:  LOS: 1 day        Requesting Physician: Dr. Cornell Warren  Reason for Consultation: r/o biliary disease               HPI:     Loulou Barber is a 38 y.o. female who has been seen for lethargy and WBC of 28. No reported abdominal pain N/V or diarrhea. Tooth abscess 4 months ago requiring antibiotics. Past Medical History:   Diagnosis Date    Diabetes ([facility] [address]     Hypertension     Hypothyroidism     Morbid obesity ([facility]     Neuropathy     Thrush        History reviewed. No pertinent surgical history. Family History   Problem Relation Age of Onset    Asthma Mother     Hypertension Mother     Asthma Brother     Diabetes Maternal Grandmother     Hypertension Maternal Grandmother     Cancer Paternal Grandfather        Social History     Socioeconomic History    Marital status: SINGLE     Spouse name: Not on file    Number of children: Not on file    Years of education: Not on file    Highest education level: Not on file   Tobacco Use    Smoking status: Never Smoker    Smokeless tobacco: Never Used   Substance and Sexual Activity    Alcohol use: Never    Drug use: Never   Other Topics Concern     Social Determinants of Health     Financial Resource Strain:     Difficulty of Paying Living Expenses:    Food Insecurity:     Worried About Running Out of Food in the Last Year:     Ran Out of Food in the Last Year:    Transportation Needs:     Lack of Transportation (Medical):      Lack of Transportation (Non-Medical):    Physical Activity:     Days of Exercise per Week:     Minutes of Exercise per Session:    Stress:     Feeling of Stress :    Social Connections:     Frequency of Communication with Friends and Family:     Frequency of Social Gatherings with Friends and Family:     Attends [employer] Services:     Active Member of Clubs or Organizations:     Attends Club or Organization Meetings:     Marital Status:        Prior to Admission medications    Not on File       Allergies   Allergen Reactions    Codeine Itching    Gabapentin Other (comments)     \"Makes my skin crawl\"    Lyrica [Pregabalin] Other (comments)     \"Makes my skin crawl\"       Review of Systems  A comprehensive review of systems was negative. Physical Exam:      Visit Vitals  /72   Pulse 78   Temp 98.1 °F (36.7 °C)   Resp 20   Ht 5' 4\" (1.626 m)   Wt 104.3 kg (230 lb)   SpO2 98%   Breastfeeding No   BMI 39.48 kg/m²         Physical Exam:    GENERAL: alert, cooperative, no distress, appears stated age, LUNG: clear to auscultation bilaterally, HEART: regular rate and rhythm, ABDOMEN: soft, non-tender. Bowel sounds normal. No masses,  no organomegaly, EXTREMITIES:  extremities normal, atraumatic, no cyanosis or edema, SKIN: no rash or abnormalities    Labs Reviewed:      Labs: Results:       Chemistry Recent Labs     05/25/21  1315 05/25/21  0320 05/24/21  1704   * 357* 571*   * 134* 125*   K 3.9 2.8* 3.5   CL 98* 99* 88*   CO2 24 24 23   BUN 29* 32* 36*   CREA 1.14 1.19 1.38*   CA 9.0 7.8* 9.1   AGAP 10 11 14   BUCR 25* 27* 26*   AP  --  724* 934*   TP  --  5.8* 6.9   ALB  --  1.5* 1.8*   GLOB  --  4.3* 5.1*   AGRAT  --  0.3* 0.4*      CBC w/Diff Recent Labs     05/25/21  0320 05/24/21  1704   WBC 22.0* 24.8*   RBC 2.90* 3.56*   HGB 8.7* 10.8*   HCT 26.4* 32.9*    416   GRANS  --  87*   LYMPH  --  3*   EOS  --  0      Cardiac Enzymes Recent Labs     05/24/21  1704   CPK 14*   CKND1 CALCULATION NOT PERFORMED WHEN RESULT IS BELOW LINEAR LIMIT      Coagulation No results for input(s): PTP, INR, APTT, INREXT in the last 72 hours.     Lipid Panel No results found for: CHOL, CHOLPOCT, CHOLX, CHLST, CHOLV, 358468, HDL, HDLP, LDL, LDLC, DLDLP, 087661, VLDLC, VLDL, TGLX, TRIGL, TRIGP, TGLPOCT, CHHD, CHHDX   BNP No results for input(s): BNPP in the last 72 hours. Liver Enzymes Recent Labs     05/25/21  0320   TP 5.8*   ALB 1.5*   *      Thyroid Studies No results found for: T4, T3U, TSH, TSHEXT         Imaging:  images and reports reviewed      Assessment/Plan     Patient Active Problem List   Diagnosis Code    Leukocytosis D72.829    Hypoxia R09.02    Hyperglycemia due to type 2 diabetes mellitus ([facility] [address] E11.65    Noncompliance with medication regimen Z91.14    Failure of outpatient treatment Z78.9    Acute kidney injury ([facility] [address] N17.9    Thrush B37.0    Morbid obesity ([facility] [address] E66.01    Cholelithiasis K80.20    Hypertension I10    Severe dehydration E86.0    Hypokalemia E87.6       Order HIDA for finding of RUQ US with cholelithiasis although pt not complaining of RUQ pain and not typical presentation. Unknown etiology of elevated WBC. If h/o diarrhea would consider cdiff test and pt with history of antibiotics . GB etiology not likely but will f/u on HIDA.

## 2021-05-25 NOTE — PROGRESS NOTES
Reason for Admission: Hypoxia    Chart reviewed. Per H&P: \"Lelogómez Morelos is a 38 y.o. female with morbid obesity and uncontrolled type 2 diabetes mellitus with neuropathy presents with worsening shortness of breath, fatigue, and feeling poorly. She has had multiple ED visits to [facility] [address] She left AMA from [facility] on 5/17. Her creatinine was 2.6 at that time with a white blood cell count of 21. She was discharged with metronidazole and cefdinir for presumed UTI but her urine culture was negative. She reports not having an appetite for the last 8 days but she denies vomiting or diarrhea. She takes care of her 2 children with special needs who are aged 3 and 11 and was continuing to worsen at home. She had been off of her diabetic medications for the last 2 years but her PCP started her on Victoza monotherapy 2 weeks ago. About 2 days after she started the Victoza she started to feel poorly but does not regularly check her blood sugars. \"                    RUR Score: 12%                   Plan for utilizing home health: TBD         PCP: First and Last name: Jemima Lowe MD    Name of Practice:    Are you a current patient: Yes/No:    Approximate date of last visit:    Can you participate in a virtual visit with your PCP:                     Current Advanced Directive/Advance Care Plan: Full Code no ACP docs    Healthcare Decision Maker:   Click here to complete [address] including selection of the Healthcare Decision Maker Relationship (ie \"Primary\")                         Transition of Care Plan: In progress

## 2021-05-25 NOTE — PROGRESS NOTES
per Cuong Prabhakar (ED), scan on hold til tomorrow 5/25/21 per Dr. Silvestre Cruz to increase kidney function. pt being admitted.  @ 2250hrs BG

## 2021-05-25 NOTE — PROGRESS NOTES
Comprehensive Nutrition Assessment    Type and Reason for Visit: Initial, Positive nutrition screen    Nutrition Recommendations/Plan: Diet: advance diet when clinically feasible to avoid prolong NPO as it does not meet nutritional needs. Nutrition Assessment:  Pt admitted w/ hypoxia, hypokalemia, hx of type 2 DM, leukcytosis, thrush, LAURA. Malnutrition Assessment:  Malnutrition Status:  (P) At risk for malnutrition (specify) (-3% in about 1 month)      Estimated Daily Nutrient Needs:  Energy (kcal): 2031; Weight Used for Energy Requirements: Current  Protein (g): ; Weight Used for Protein Requirements: Current (0.8-1g)  Fluid (ml/day): 2031; Method Used for Fluid Requirements: 1 ml/kcal      Nutrition Related Findings:  Lab: K 2.8, Na 134, glucose 357. med: Lantus, humalog, pepcid, KCl. No BM at this time. Attempted to see patient-pt was in bathroom at time of visit; will follow-up    Wounds:    None       Current Nutrition Therapies:  DIET NPO    Anthropometric Measures:  · Height:  5' 4\" (162.6 cm)  · Current Body Wt:  104.3 kg (229 lb 15 oz)   · Admission Body Wt:       · Usual Body Wt:  108 kg (238 lb 1.6 oz) (5/17/21)     · Ideal Body Wt:  120 lbs:  191.6 %   · Adjusted Body Weight:   ; Weight Adjustment for: No adjustment   · Adjusted BMI:       · BMI Category:  Obese class 2 (BMI 35.0-39. 9)       Nutrition Diagnosis:   · Inadequate oral intake related to impaired respiratory function as evidenced by NPO or clear liquid status due to medical condition, weight loss (-3% in 1 month)    Nutrition Interventions:   Food and/or Nutrient Delivery: Start oral diet, Start oral nutrition supplement  Nutrition Education and Counseling: No recommendations at this time  Coordination of Nutrition Care: Continue to monitor while inpatient, Interdisciplinary rounds    Goals:  Advance diet if medically possible within the next 1-3 days       Nutrition Monitoring and Evaluation:   Behavioral-Environmental Outcomes: None identified  Food/Nutrient Intake Outcomes: Diet advancement/tolerance  Physical Signs/Symptoms Outcomes: Biochemical data, Skin, Weight, GI status, Nutrition focused physical findings, Nausea/vomiting    Discharge Planning:     Too soon to determine     Electronically signed by Bravo Redman on 5/25/2021 at 2:09 PM

## 2021-05-26 ENCOUNTER — APPOINTMENT (OUTPATIENT)
Dept: NUCLEAR MEDICINE | Age: 41
End: 2021-05-26
Attending: FAMILY MEDICINE
Payer: MEDICAID

## 2021-05-26 LAB
ALBUMIN SERPL-MCNC: 1.4 G/DL (ref 3.4–5)
ALBUMIN/GLOB SERPL: 0.3 {RATIO} (ref 0.8–1.7)
ALP SERPL-CCNC: 820 U/L (ref 45–117)
ALT SERPL-CCNC: 17 U/L (ref 13–56)
ANION GAP SERPL CALC-SCNC: 8 MMOL/L (ref 3–18)
ANION GAP SERPL CALC-SCNC: 9 MMOL/L (ref 3–18)
AST SERPL-CCNC: 36 U/L (ref 10–38)
BACTERIA SPEC CULT: ABNORMAL
BILIRUB SERPL-MCNC: 2.1 MG/DL (ref 0.2–1)
BUN SERPL-MCNC: 24 MG/DL (ref 7–18)
BUN SERPL-MCNC: 24 MG/DL (ref 7–18)
BUN/CREAT SERPL: 22 (ref 12–20)
BUN/CREAT SERPL: 23 (ref 12–20)
CALCIUM SERPL-MCNC: 7.7 MG/DL (ref 8.5–10.1)
CALCIUM SERPL-MCNC: 8.3 MG/DL (ref 8.5–10.1)
CC UR VC: ABNORMAL
CHLORIDE SERPL-SCNC: 106 MMOL/L (ref 100–111)
CHLORIDE SERPL-SCNC: 108 MMOL/L (ref 100–111)
CO2 SERPL-SCNC: 22 MMOL/L (ref 21–32)
CO2 SERPL-SCNC: 24 MMOL/L (ref 21–32)
CREAT SERPL-MCNC: 1.06 MG/DL (ref 0.6–1.3)
CREAT SERPL-MCNC: 1.1 MG/DL (ref 0.6–1.3)
ERYTHROCYTE [DISTWIDTH] IN BLOOD BY AUTOMATED COUNT: 14 % (ref 11.6–14.5)
GLOBULIN SER CALC-MCNC: 4.4 G/DL (ref 2–4)
GLUCOSE BLD STRIP.AUTO-MCNC: 171 MG/DL (ref 70–110)
GLUCOSE BLD STRIP.AUTO-MCNC: 208 MG/DL (ref 70–110)
GLUCOSE BLD STRIP.AUTO-MCNC: 211 MG/DL (ref 70–110)
GLUCOSE BLD STRIP.AUTO-MCNC: 228 MG/DL (ref 70–110)
GLUCOSE BLD STRIP.AUTO-MCNC: 257 MG/DL (ref 70–110)
GLUCOSE SERPL-MCNC: 164 MG/DL (ref 74–99)
GLUCOSE SERPL-MCNC: 200 MG/DL (ref 74–99)
HCT VFR BLD AUTO: 25.8 % (ref 35–45)
HGB BLD-MCNC: 8.3 G/DL (ref 12–16)
MCH RBC QN AUTO: 29.7 PG (ref 24–34)
MCHC RBC AUTO-ENTMCNC: 32.2 G/DL (ref 31–37)
MCV RBC AUTO: 92.5 FL (ref 74–97)
PLATELET # BLD AUTO: 411 K/UL (ref 135–420)
PMV BLD AUTO: 10.9 FL (ref 9.2–11.8)
POTASSIUM SERPL-SCNC: 3.6 MMOL/L (ref 3.5–5.5)
POTASSIUM SERPL-SCNC: 3.7 MMOL/L (ref 3.5–5.5)
PROT SERPL-MCNC: 5.8 G/DL (ref 6.4–8.2)
RBC # BLD AUTO: 2.79 M/UL (ref 4.2–5.3)
SERVICE CMNT-IMP: ABNORMAL
SODIUM SERPL-SCNC: 138 MMOL/L (ref 136–145)
SODIUM SERPL-SCNC: 139 MMOL/L (ref 136–145)
WBC # BLD AUTO: 21.3 K/UL (ref 4.6–13.2)

## 2021-05-26 PROCEDURE — 74011636637 HC RX REV CODE- 636/637: Performed by: FAMILY MEDICINE

## 2021-05-26 PROCEDURE — 85027 COMPLETE CBC AUTOMATED: CPT

## 2021-05-26 PROCEDURE — 96372 THER/PROPH/DIAG INJ SC/IM: CPT

## 2021-05-26 PROCEDURE — 74011000250 HC RX REV CODE- 250: Performed by: FAMILY MEDICINE

## 2021-05-26 PROCEDURE — 74011000258 HC RX REV CODE- 258: Performed by: FAMILY MEDICINE

## 2021-05-26 PROCEDURE — 96376 TX/PRO/DX INJ SAME DRUG ADON: CPT

## 2021-05-26 PROCEDURE — 74011250636 HC RX REV CODE- 250/636: Performed by: HOSPITALIST

## 2021-05-26 PROCEDURE — 65660000000 HC RM CCU STEPDOWN

## 2021-05-26 PROCEDURE — 74011636637 HC RX REV CODE- 636/637: Performed by: HOSPITALIST

## 2021-05-26 PROCEDURE — 80053 COMPREHEN METABOLIC PANEL: CPT

## 2021-05-26 PROCEDURE — 74011000258 HC RX REV CODE- 258: Performed by: HOSPITALIST

## 2021-05-26 PROCEDURE — 82962 GLUCOSE BLOOD TEST: CPT

## 2021-05-26 PROCEDURE — 96375 TX/PRO/DX INJ NEW DRUG ADDON: CPT

## 2021-05-26 PROCEDURE — 36415 COLL VENOUS BLD VENIPUNCTURE: CPT

## 2021-05-26 PROCEDURE — 99218 HC RM OBSERVATION: CPT

## 2021-05-26 PROCEDURE — 74011250636 HC RX REV CODE- 250/636: Performed by: FAMILY MEDICINE

## 2021-05-26 PROCEDURE — 74011250637 HC RX REV CODE- 250/637: Performed by: FAMILY MEDICINE

## 2021-05-26 RX ORDER — INSULIN LISPRO 100 [IU]/ML
INJECTION, SOLUTION INTRAVENOUS; SUBCUTANEOUS
Status: DISCONTINUED | OUTPATIENT
Start: 2021-05-26 | End: 2021-05-27 | Stop reason: HOSPADM

## 2021-05-26 RX ORDER — WATER FOR INJECTION,STERILE
VIAL (ML) INJECTION
Status: COMPLETED | OUTPATIENT
Start: 2021-05-26 | End: 2021-05-26

## 2021-05-26 RX ORDER — INSULIN LISPRO 100 [IU]/ML
4 INJECTION, SOLUTION INTRAVENOUS; SUBCUTANEOUS
Status: DISCONTINUED | OUTPATIENT
Start: 2021-05-26 | End: 2021-05-27 | Stop reason: HOSPADM

## 2021-05-26 RX ADMIN — LEVOTHYROXINE SODIUM 75 MCG: 0.07 TABLET ORAL at 05:04

## 2021-05-26 RX ADMIN — ACETAMINOPHEN 650 MG: 325 TABLET ORAL at 10:16

## 2021-05-26 RX ADMIN — PIPERACILLIN AND TAZOBACTAM 3.38 G: 3; .375 INJECTION, POWDER, LYOPHILIZED, FOR SOLUTION INTRAVENOUS at 17:57

## 2021-05-26 RX ADMIN — ASPIRIN 81 MG CHEWABLE TABLET 81 MG: 81 TABLET CHEWABLE at 10:17

## 2021-05-26 RX ADMIN — PIPERACILLIN AND TAZOBACTAM 3.38 G: 3; .375 INJECTION, POWDER, LYOPHILIZED, FOR SOLUTION INTRAVENOUS at 00:36

## 2021-05-26 RX ADMIN — Medication 10 ML: at 00:00

## 2021-05-26 RX ADMIN — INSULIN LISPRO 6 UNITS: 100 INJECTION, SOLUTION INTRAVENOUS; SUBCUTANEOUS at 00:39

## 2021-05-26 RX ADMIN — WATER 5 ML: 1 INJECTION INTRAMUSCULAR; INTRAVENOUS; SUBCUTANEOUS at 11:06

## 2021-05-26 RX ADMIN — INSULIN GLARGINE 21 UNITS: 100 INJECTION, SOLUTION SUBCUTANEOUS at 21:58

## 2021-05-26 RX ADMIN — NYSTATIN 500000 UNITS: 100000 SUSPENSION ORAL at 10:17

## 2021-05-26 RX ADMIN — SODIUM CHLORIDE 125 ML/HR: 900 INJECTION, SOLUTION INTRAVENOUS at 19:13

## 2021-05-26 RX ADMIN — TRAMADOL HYDROCHLORIDE 50 MG: 50 TABLET, FILM COATED ORAL at 07:48

## 2021-05-26 RX ADMIN — INSULIN LISPRO 2 UNITS: 100 INJECTION, SOLUTION INTRAVENOUS; SUBCUTANEOUS at 05:06

## 2021-05-26 RX ADMIN — INSULIN LISPRO 4 UNITS: 100 INJECTION, SOLUTION INTRAVENOUS; SUBCUTANEOUS at 21:58

## 2021-05-26 RX ADMIN — NYSTATIN 500000 UNITS: 100000 SUSPENSION ORAL at 17:58

## 2021-05-26 RX ADMIN — INSULIN LISPRO 4 UNITS: 100 INJECTION, SOLUTION INTRAVENOUS; SUBCUTANEOUS at 00:37

## 2021-05-26 RX ADMIN — INSULIN LISPRO 4 UNITS: 100 INJECTION, SOLUTION INTRAVENOUS; SUBCUTANEOUS at 22:01

## 2021-05-26 RX ADMIN — PIPERACILLIN AND TAZOBACTAM 3.38 G: 3; .375 INJECTION, POWDER, LYOPHILIZED, FOR SOLUTION INTRAVENOUS at 12:46

## 2021-05-26 RX ADMIN — ACETAMINOPHEN 650 MG: 325 TABLET ORAL at 15:19

## 2021-05-26 RX ADMIN — NYSTATIN 500000 UNITS: 100000 SUSPENSION ORAL at 12:52

## 2021-05-26 RX ADMIN — INSULIN LISPRO 4 UNITS: 100 INJECTION, SOLUTION INTRAVENOUS; SUBCUTANEOUS at 05:04

## 2021-05-26 RX ADMIN — DOXYCYCLINE 100 MG: 100 INJECTION, POWDER, LYOPHILIZED, FOR SOLUTION INTRAVENOUS at 12:46

## 2021-05-26 RX ADMIN — TRAMADOL HYDROCHLORIDE 50 MG: 50 TABLET, FILM COATED ORAL at 21:58

## 2021-05-26 RX ADMIN — FAMOTIDINE 20 MG: 20 TABLET ORAL at 10:17

## 2021-05-26 RX ADMIN — PIPERACILLIN AND TAZOBACTAM 3.38 G: 3; .375 INJECTION, POWDER, LYOPHILIZED, FOR SOLUTION INTRAVENOUS at 05:05

## 2021-05-26 RX ADMIN — DOXYCYCLINE 100 MG: 100 INJECTION, POWDER, LYOPHILIZED, FOR SOLUTION INTRAVENOUS at 22:01

## 2021-05-26 RX ADMIN — TRAMADOL HYDROCHLORIDE 50 MG: 50 TABLET, FILM COATED ORAL at 15:20

## 2021-05-26 RX ADMIN — INSULIN LISPRO 4 UNITS: 100 INJECTION, SOLUTION INTRAVENOUS; SUBCUTANEOUS at 18:00

## 2021-05-26 RX ADMIN — ENOXAPARIN SODIUM 40 MG: 40 INJECTION SUBCUTANEOUS at 21:58

## 2021-05-26 RX ADMIN — SINCALIDE 2.09 MCG: 5 INJECTION, POWDER, LYOPHILIZED, FOR SOLUTION INTRAVENOUS at 11:07

## 2021-05-26 RX ADMIN — INSULIN LISPRO 4 UNITS: 100 INJECTION, SOLUTION INTRAVENOUS; SUBCUTANEOUS at 12:00

## 2021-05-26 NOTE — PROGRESS NOTES
Discharge Planning: Home with family assist and MD follow-up vs home with home health    Chart reviewed. CM spoke with the patient and informed her of the CM's role. The patient confirmed demographics and PCP. The patient states that she is not happy with her current PCP, Greyson Jones, and that her mother is working to get her switched over to her doctor. CM informed the patient that if that plan falls through this CM would be happy to assist her with finding a new PCP. CM and the patient discussed discharge plans to include transportation upon discharge, DME, family support, and transportation to and from appointments. The patient states that she was independent prior to this admission, driving herself places and not using any DME for ambulation. The patient states that a family member/friend will be available to drive her home upon discharge. The patient denies any questions or concerns at this time. CM to follow the patient's progress and be available to assist with discharge planning as needed. CMS referral placed. Care Management Interventions  PCP Verified by CM: Yes  Mode of Transport at Discharge:  Other (see comment) (family)  Transition of Care Consult (CM Consult): Discharge Planning  Current Support Network: Own Home  Confirm Follow Up Transport: Family  The Plan for Transition of Care is Related to the Following Treatment Goals : Hypoxia  Discharge Location  Discharge Placement: Home with family assistance (vs home with hh)

## 2021-05-26 NOTE — PROGRESS NOTES
Problem: Diabetes Maintenance:Admission  Goal: Activity/Safety  Outcome: Progressing Towards Goal     Problem: Diabetes Maintenance:Admission  Goal: Diagnostic Tests/Procedures  Outcome: Progressing Towards Goal     Problem: Diabetes Maintenance:Admission  Goal: Nutrition  Outcome: Progressing Towards Goal     Problem: Diabetes Maintenance:Admission  Goal: Medications  Outcome: Progressing Towards Goal     Problem: Diabetes Maintenance:Admission  Goal: Treatments/Interventions/Procedures  Outcome: Progressing Towards Goal     Problem: Diabetes Maintenance:Admission  Goal: Treatments/Interventions/Procedures  Outcome: Progressing Towards Goal     Problem: Diabetes Maintenance:Ongoing  Goal: Activity/Safety  Outcome: Progressing Towards Goal     Problem: Diabetes Maintenance:Ongoing  Goal: Nutrition  Outcome: Progressing Towards Goal     Problem: Diabetes Maintenance:Discharge Outcomes  Goal: *Blood glucose at patient's target range or approaching  Outcome: Progressing Towards Goal     Problem: Diabetes Maintenance:Discharge Outcomes  Goal: *Aware of nutrition guidelines  Outcome: Progressing Towards Goal     Problem: Diabetes Maintenance:Discharge Outcomes  Goal: *Verbalizes information about medication  Description: Verbalizes name, dosage, time, side effects, and number of days to  continue medications.   Outcome: Progressing Towards Goal     Problem: Diabetes Maintenance:Discharge Outcomes  Goal: *Describes goals, rules, symptoms, and treatments  Description: Describes blood glucose goals, monitoring, sick day rules,  hypo/hyperglycemia prevention, symptoms, and treatment  Outcome: Progressing Towards Goal     Problem: Diabetes Maintenance:Discharge Outcomes  Goal: *Describes available outpatient diabetes resources and support systems  Outcome: Progressing Towards Goal     Problem: Falls - Risk of  Goal: *Absence of Falls  Description: Document Janie Fall Risk and appropriate interventions in the flowsheet.   Outcome: Progressing Towards Goal  Note: Fall Risk Interventions:            Medication Interventions: Assess postural VS orthostatic hypotension, Bed/chair exit alarm, Patient to call before getting OOB, Teach patient to arise slowly                   Problem: Patient Education: Go to Patient Education Activity  Goal: Patient/Family Education  Outcome: Progressing Towards Goal     Problem: Diabetes Self-Management  Goal: *Sick day guidelines  Outcome: Progressing Towards Goal     Problem: Diabetes Self-Management  Goal: *Patient Specific Goal (EDIT GOAL, INSERT TEXT)  Outcome: Progressing Towards Goal     Problem: Patient Education: Go to Patient Education Activity  Goal: Patient/Family Education  Outcome: Progressing Towards Goal

## 2021-05-26 NOTE — ROUTINE PROCESS
Assume care of patient from ERNESTO BERKOWITZ RN. Patient received in bed awake. Patient A&Ox4, denies pain and discomfort. No distress noted. Bed locked in low position. Call bell within reach and patient verbalized understanding of use for assistance and needs. 0720- Bedside and Verbal shift change report given to Elizabeth Knapp RN (oncoming nurse) by Karen Nichols RN (offgoing nurse). Report included the following information SBAR, Kardex, Intake/Output, MAR and Recent Results. 3 [place] [facility] Night Shift Chart Audit Chart Audit completed?  YES

## 2021-05-26 NOTE — PROGRESS NOTES
Bedside and Verbal shift change report given to Anay Artis RN (oncoming nurse) by Nathanial Barthel RN (offgoing nurse). Report included the following information SBAR, Kardex, ED Summary, Intake/Output, MAR, Recent Results, Med Rec Status and Cardiac Rhythm NSR.     2015 Shift assessment complete. Patient is resting quietly with eyes open and chest rising and falling evenly. No signs of distress. 2100 Patient complained of pain behind right eye. Stated it was throbbing. Hospitalist was contacted and Toradol was given. Patient is now relaxing quietly. 6179 Shift reassessment completed. Patient is resting quietly with eyes open and chest rising and falling evenly. No signs of distress or pain.      3 [place] Night shift chart check complete

## 2021-05-26 NOTE — DIABETES MGMT
Diabetes Patient/Family Education Record    Factors That May Influence Patients Ability to Learn or Comply with Recommendations   []   Language barrier    []   Cultural needs   []   Motivation    []   Cognitive limitation    []   Physical   []   Education    []   Physiological factors   []   Hearing/vision/speaking impairment   []   Faith beliefs    []   Financial factors   []  Other:   [x]  No factors identified at this time. Person Instructed:   [x]   Patient   []   Family   []  Other     Preference for Learning:   [x]   Verbal   []   Written   []  Demonstration     Level of Comprehension & Competence:    [x]  Good                                      [] Fair                                     []  Poor                             []  Needs Reinforcement   [x]  Teach back completed    Education Component:   []  Medication management, including how to administer insulin (if appropriate) and potential medication interactions    []  Nutritional management - [] Obtained usual meal pattern   []   Basic carbohydrate counting  []  Plate method  []  Limit concentrated sweets and avoid sweetened beverages  []  Portion control  []    Avoid skipping meals   []  Exercise   []  Signs, symptoms, and treatment of hyperglycemia and hypoglycemia   [] Prevention, recognition and treatment of hyperglycemia and hypoglycemia   [x]  Importance of blood glucose monitoring  [] Blood Glucose targets   [x]   Provided patient with blood glucose meter  []  Has glucometer and supplies at home   []  Instruction on use of the blood glucose meter and recommended monitoring schedule   [x]  Discuss the importance of HbA1C monitoring. Patients A1c is _13.7__ %.  This is equivalent to average glucose of 351 mg/dl for the past 2-3 months.   []  Sick day guidelines   []  Proper use and disposal of lancets, needles, syringes or insulin pens (if appropriate)   []  Potential long-term complications (retinopathy, kidney disease, neuropathy, foot care)   [] Information about whom to contact in case of emergency or for more information    [x]  Goal:  Patient/family will demonstrate understanding of Diabetes Self- Management Skills by: (date) ___6/30____  Plan for post-discharge education or self-management support:    [] Outpatient class schedule provided            [] Patient Declined    [] Scheduled for outpatient classes (date) _______    [] Written information provided  Verify: [] Prior to admission Diabetes medications    Does patient understand how diabetes medications work? ____________________________  Does patient have difficulty obtaining diabetes medications or testing supplies? _________________     Jason Lozano MS, RN, CDE  Glycemic Control Team  [phone]  Pager [phone] (M-TH 8:00-4:30P)  *After Hours pager [phone]

## 2021-05-26 NOTE — PROGRESS NOTES
Hospitalist Progress Note    Patient: Lavon Gamboa MRN: [id number]  CSN: [id number]    YOB: 1980  Age: 38 y.o. Sex: female    DOA: 5/24/2021 LOS:  LOS: 2 days                Assessment/Plan     Patient Active Problem List   Diagnosis Code    Leukocytosis D72.829    Hypoxia R09.02    Hyperglycemia due to type 2 diabetes mellitus ([facility] [address] E11.65    Noncompliance with medication regimen Z91.14    Failure of outpatient treatment Z78.9    Acute kidney injury ([facility] [address] N17.9   [place] B37.0    Morbid obesity ([facility] [address] E66.01    Cholelithiasis K80.20    Hypertension I10    Severe dehydration E86.0    Hypokalemia E87.6            26-year-old female with morbid obesity and uncontrolled type 2 diabetes mellitus with neuropathy who has been off her diabetic medication for the last 2 years for insurance reasons is admitted for leukocytosis, hypoxia, failure of outpatient treatment, uncontrolled hyperglycemia, and acute kidney injury. Hypoxia -  On oxygen, wean as tolerated  Incentive spirometry  CT chest with atelectasis vs infiltrate    Pneumonia -  Continue on zosyn and doxycycline    LAURA -  Resolved with IVF    Hypokalemia -  Replaced    DM -  Uncontrolled  Continue with lantus, pre meal and SSI  ADA diet. Oral thrush -  Nystatin     US abdomen with cholelithiasis, no cholecystitis. HIDA scan normal.  Seen by general surgery    DVT prophylaxis with lovenox. Discussed with patient and mother at bedside, discussed hospitalization, discussed labs and imaging. Disposition : TBD    Review of systems  General: No fevers or chills. Cardiovascular: No chest pain or pressure. No palpitations. Pulmonary: No shortness of breath. Gastrointestinal: No nausea, vomiting. Physical Exam:  General: Awake, cooperative, no acute distress    HEENT: NC, Atraumatic. PERRLA, anicteric sclerae. Lungs: CTA Bilaterally. No Wheezing/Rhonchi/Rales.   Heart:  S1 S2,  No murmur, No Rubs, No Gallops  Abdomen: Soft, Non distended, Non tender.  +Bowel sounds,   Extremities: No c/c/e  Psych:   Not anxious or agitated. Neurologic:  No acute neurological deficit. Vital signs/Intake and Output:  Visit Vitals  BP (!) 146/86   Pulse 78   Temp 98 °F (36.7 °C)   Resp 18   Ht 5' 4\" (1.626 m)   Wt 104.3 kg (230 lb)   SpO2 94%   Breastfeeding No   BMI 39.48 kg/m²     Current Shift:  No intake/output data recorded. Last three shifts:  05/24 1901 - 05/26 0700  In: 1260 [I.V.:1260]  Out: -             Labs: Results:       Chemistry Recent Labs     05/26/21  1509 05/26/21  0257 05/25/21  1315 05/25/21  0320 05/24/21  1704   * 200* 364* 357* 571*    138 132* 134* 125*   K 3.6 3.7 3.9 2.8* 3.5    106 98* 99* 88*   CO2 22 24 24 24 23   BUN 24* 24* 29* 32* 36*   CREA 1.06 1.10 1.14 1.19 1.38*   CA 8.3* 7.7* 9.0 7.8* 9.1   AGAP 9 8 10 11 14   BUCR 23* 22* 25* 27* 26*   AP  --  820*  --  724* 934*   TP  --  5.8*  --  5.8* 6.9   ALB  --  1.4*  --  1.5* 1.8*   GLOB  --  4.4*  --  4.3* 5.1*   AGRAT  --  0.3*  --  0.3* 0.4*      CBC w/Diff Recent Labs     05/26/21  0257 05/25/21  0320 05/24/21  1704   WBC 21.3* 22.0* 24.8*   RBC 2.79* 2.90* 3.56*   HGB 8.3* 8.7* 10.8*   HCT 25.8* 26.4* 32.9*    360 416   GRANS  --   --  87*   LYMPH  --   --  3*   EOS  --   --  0      Cardiac Enzymes Recent Labs     05/24/21  1704   CPK 14*   CKND1 CALCULATION NOT PERFORMED WHEN RESULT IS BELOW LINEAR LIMIT      Coagulation No results for input(s): PTP, INR, APTT, INREXT in the last 72 hours. Lipid Panel No results found for: CHOL, CHOLPOCT, CHOLX, CHLST, CHOLV, 637048, HDL, HDLP, LDL, LDLC, DLDLP, 961109, VLDLC, VLDL, TGLX, TRIGL, TRIGP, TGLPOCT, CHHD, CHHDX   BNP No results for input(s): BNPP in the last 72 hours.    Liver Enzymes Recent Labs     05/26/21  0257   TP 5.8*   ALB 1.4*   *      Thyroid Studies No results found for: T4, T3U, TSH, TSHEXT     Procedures/imaging: see electronic medical records for all procedures/Xrays and details which were not copied into this note but were reviewed prior to creation of Plan

## 2021-05-26 NOTE — ROUTINE PROCESS
Assume care of patient from [address]. Patient received in bed awake. Patient A&Ox4, No distress noted. Bed locked in low position. Call bell within reach and patient verbalized understanding of use for assistance and needs. Precepting Maria G Redd RN. Patient verbalized that she has pain 9/10 behind her right eye, describes as throbbing. Called and informed Dr. Keri Macedo. Orders placed for Toradol 15 mg IV. RBV. Patient verbalized pain down to 0. Will continue to monitor. 2782- Informed MD that patient refused Pepcid 20 mg. Pt stated medication caused nausea and upset stomach, she also has had this effect when taken OTC. MD stated okay. Will continue to monitor. 4414- Bedside and Verbal shift change report given to ABBIE Hyman (oncoming nurse) by Lisandro Arana RN (offgoing nurse). Report included the following information SBAR, Kardex, Intake/Output, MAR and Recent Results. 3 [place] [facility] Night Shift Chart Audit Chart Audit completed?  YES

## 2021-05-26 NOTE — DIABETES MGMT
GLYCEMIC CONTROL PLAN OF CARE        Diabetes Management:      Assessment:    Recommend: significant decrease in FBG with basal insulin increase, mealtime insulin initiated yesterday, continued glucose excursions  - recommend monitor lunch BG, pt might benefit from adjustment to mealtime insulin    BG in target range (non-ICU\" < 180 ; -180):  [] Yes  [x] No      Steroids: no    TDD previous day = 68 (36 Lantus + 4 MTI + 18 Humalog Very Insulin Resistant Corrective Coverage + 10 regular)    Addendum:  - pt will need prescriptions for the following items (insurance has covered Lantus Solostar pen in the past   Lantus Solostar Pen (dose to be determined)  BD Ultra-Fine Sarai Pen needles (100 count box) 4 mm (5/32\") x 32 G  ND[facility] [place] [id number]  -pt needs glucometer to monitor BG at home  -the script should read  -Preferred Glucometer kit per insurance   -3 test strips/3 lancets per day  -300 test strips/300 lancets per month              Most recent blood glucose results:   Lab Results   Component Value Date/Time     (H) 05/26/2021 02:57 AM     (H) 05/25/2021 01:15 PM    GLUCPOC 171 (H) 05/26/2021 05:03 AM    GLUCPOC 257 (H) 05/26/2021 12:35 AM    GLUCPOC 254 (H) 05/25/2021 09:27 PM         Hypo: No    HbA1C: equivalent  to ave BGlucose of 351 mg/dl for 2-3 months prior to admission  Lab Results   Component Value Date/Time    Hemoglobin A1c 13.7 (H) 05/25/2021 03:20 AM       Adequate glycemic control PTA:  [] Yes  [x] No      Home diabetes medications:   Key Antihyperglycemic Medications     Patient is on no antihyperglycemic meds.         Goals:  Blood glucose will be within target range of 70 - 180 mg/dL by: 6/15    Diet:   Active Orders   Diet    DIET NPO       Education:  ___X_ Refer to Diabetes Education Record                       _____ Education not indicated at this time    Judy Redman MS, RN, CDE  Glycemic Control Team  [phone]  Pager [phone] (M-TH 8:00-4:30P)  *After Hours pager [phone]

## 2021-05-27 VITALS
HEIGHT: 64 IN | BODY MASS INDEX: 39.27 KG/M2 | DIASTOLIC BLOOD PRESSURE: 89 MMHG | RESPIRATION RATE: 18 BRPM | HEART RATE: 77 BPM | TEMPERATURE: 97.7 F | OXYGEN SATURATION: 96 % | WEIGHT: 230 LBS | SYSTOLIC BLOOD PRESSURE: 146 MMHG

## 2021-05-27 LAB
ALBUMIN SERPL-MCNC: 1.5 G/DL (ref 3.4–5)
ALBUMIN/GLOB SERPL: 0.3 {RATIO} (ref 0.8–1.7)
ALP SERPL-CCNC: 923 U/L (ref 45–117)
ALT SERPL-CCNC: 16 U/L (ref 13–56)
ANION GAP SERPL CALC-SCNC: 8 MMOL/L (ref 3–18)
AST SERPL-CCNC: 40 U/L (ref 10–38)
BILIRUB SERPL-MCNC: 2.3 MG/DL (ref 0.2–1)
BUN SERPL-MCNC: 22 MG/DL (ref 7–18)
BUN/CREAT SERPL: 20 (ref 12–20)
CALCIUM SERPL-MCNC: 7.7 MG/DL (ref 8.5–10.1)
CHLORIDE SERPL-SCNC: 106 MMOL/L (ref 100–111)
CO2 SERPL-SCNC: 23 MMOL/L (ref 21–32)
CREAT SERPL-MCNC: 1.11 MG/DL (ref 0.6–1.3)
ERYTHROCYTE [DISTWIDTH] IN BLOOD BY AUTOMATED COUNT: 14.6 % (ref 11.6–14.5)
GLOBULIN SER CALC-MCNC: 4.5 G/DL (ref 2–4)
GLUCOSE BLD STRIP.AUTO-MCNC: 148 MG/DL (ref 70–110)
GLUCOSE BLD STRIP.AUTO-MCNC: 175 MG/DL (ref 70–110)
GLUCOSE BLD STRIP.AUTO-MCNC: 180 MG/DL (ref 70–110)
GLUCOSE BLD STRIP.AUTO-MCNC: 197 MG/DL (ref 70–110)
GLUCOSE SERPL-MCNC: 133 MG/DL (ref 74–99)
HCT VFR BLD AUTO: 25.2 % (ref 35–45)
HGB BLD-MCNC: 8.2 G/DL (ref 12–16)
MCH RBC QN AUTO: 30.8 PG (ref 24–34)
MCHC RBC AUTO-ENTMCNC: 32.5 G/DL (ref 31–37)
MCV RBC AUTO: 94.7 FL (ref 74–97)
PLATELET # BLD AUTO: 428 K/UL (ref 135–420)
PMV BLD AUTO: 10.8 FL (ref 9.2–11.8)
POTASSIUM SERPL-SCNC: 3.8 MMOL/L (ref 3.5–5.5)
PROT SERPL-MCNC: 6 G/DL (ref 6.4–8.2)
RBC # BLD AUTO: 2.66 M/UL (ref 4.2–5.3)
SODIUM SERPL-SCNC: 137 MMOL/L (ref 136–145)
WBC # BLD AUTO: 17.4 K/UL (ref 4.6–13.2)

## 2021-05-27 PROCEDURE — 74011000258 HC RX REV CODE- 258: Performed by: FAMILY MEDICINE

## 2021-05-27 PROCEDURE — 80053 COMPREHEN METABOLIC PANEL: CPT

## 2021-05-27 PROCEDURE — 36415 COLL VENOUS BLD VENIPUNCTURE: CPT

## 2021-05-27 PROCEDURE — 99218 HC RM OBSERVATION: CPT

## 2021-05-27 PROCEDURE — 96376 TX/PRO/DX INJ SAME DRUG ADON: CPT

## 2021-05-27 PROCEDURE — 74011250637 HC RX REV CODE- 250/637: Performed by: FAMILY MEDICINE

## 2021-05-27 PROCEDURE — 74011250636 HC RX REV CODE- 250/636: Performed by: HOSPITALIST

## 2021-05-27 PROCEDURE — 74011000258 HC RX REV CODE- 258: Performed by: HOSPITALIST

## 2021-05-27 PROCEDURE — 74011636637 HC RX REV CODE- 636/637: Performed by: FAMILY MEDICINE

## 2021-05-27 PROCEDURE — 82962 GLUCOSE BLOOD TEST: CPT

## 2021-05-27 PROCEDURE — 77010033678 HC OXYGEN DAILY

## 2021-05-27 PROCEDURE — 74011250636 HC RX REV CODE- 250/636: Performed by: FAMILY MEDICINE

## 2021-05-27 PROCEDURE — 85027 COMPLETE CBC AUTOMATED: CPT

## 2021-05-27 RX ORDER — INSULIN PUMP SYRINGE, 3 ML
EACH MISCELLANEOUS
Qty: 1 KIT | Refills: 0 | Status: SHIPPED | OUTPATIENT
Start: 2021-05-27

## 2021-05-27 RX ORDER — IBUPROFEN 200 MG
CAPSULE ORAL
Qty: 100 STRIP | Refills: 2 | Status: SHIPPED | OUTPATIENT
Start: 2021-05-27

## 2021-05-27 RX ORDER — DOXYCYCLINE 100 MG/1
100 CAPSULE ORAL 2 TIMES DAILY
Qty: 14 CAPSULE | Refills: 0 | Status: SHIPPED | OUTPATIENT
Start: 2021-05-27 | End: 2021-06-03

## 2021-05-27 RX ORDER — LANCETS
EACH MISCELLANEOUS
Qty: 100 EACH | Refills: 11 | Status: SHIPPED | OUTPATIENT
Start: 2021-05-27

## 2021-05-27 RX ORDER — INSULIN GLARGINE 100 [IU]/ML
25 INJECTION, SOLUTION SUBCUTANEOUS
Qty: 5 PEN | Refills: 2 | Status: SHIPPED | OUTPATIENT
Start: 2021-05-27

## 2021-05-27 RX ORDER — INSULIN LISPRO 100 [IU]/ML
INJECTION, SOLUTION INTRAVENOUS; SUBCUTANEOUS
Qty: 1 PACKAGE | Refills: 2 | Status: SHIPPED | OUTPATIENT
Start: 2021-05-27

## 2021-05-27 RX ADMIN — LEVOTHYROXINE SODIUM 75 MCG: 0.07 TABLET ORAL at 06:40

## 2021-05-27 RX ADMIN — ACETAMINOPHEN 650 MG: 325 TABLET ORAL at 00:17

## 2021-05-27 RX ADMIN — DOXYCYCLINE 100 MG: 100 INJECTION, POWDER, LYOPHILIZED, FOR SOLUTION INTRAVENOUS at 12:10

## 2021-05-27 RX ADMIN — ASPIRIN 81 MG CHEWABLE TABLET 81 MG: 81 TABLET CHEWABLE at 08:23

## 2021-05-27 RX ADMIN — INSULIN LISPRO 4 UNITS: 100 INJECTION, SOLUTION INTRAVENOUS; SUBCUTANEOUS at 12:13

## 2021-05-27 RX ADMIN — PIPERACILLIN AND TAZOBACTAM 3.38 G: 3; .375 INJECTION, POWDER, LYOPHILIZED, FOR SOLUTION INTRAVENOUS at 13:57

## 2021-05-27 RX ADMIN — PIPERACILLIN AND TAZOBACTAM 3.38 G: 3; .375 INJECTION, POWDER, LYOPHILIZED, FOR SOLUTION INTRAVENOUS at 06:39

## 2021-05-27 RX ADMIN — INSULIN LISPRO 4 UNITS: 100 INJECTION, SOLUTION INTRAVENOUS; SUBCUTANEOUS at 08:26

## 2021-05-27 RX ADMIN — PIPERACILLIN AND TAZOBACTAM 3.38 G: 3; .375 INJECTION, POWDER, LYOPHILIZED, FOR SOLUTION INTRAVENOUS at 00:17

## 2021-05-27 RX ADMIN — ACETAMINOPHEN 650 MG: 325 TABLET ORAL at 08:23

## 2021-05-27 RX ADMIN — INSULIN LISPRO 2 UNITS: 100 INJECTION, SOLUTION INTRAVENOUS; SUBCUTANEOUS at 12:12

## 2021-05-27 NOTE — PROGRESS NOTES
Comprehensive Nutrition Assessment    Type and Reason for Visit: Reassess, Positive nutrition screen    Nutrition Recommendations/Plan: Supplement: glucerna BID    Nutrition Assessment:  Pt admitted w/ hypoxia, hypokalemia, hx of type 2 DM, leukcytosis, thrush, LAURA. Malnutrition Assessment:  Malnutrition Status: At risk for malnutrition (specify) (-3% in about 1 month)      Estimated Daily Nutrient Needs:  Energy (kcal): 2031; Weight Used for Energy Requirements: Current  Protein (g): ; Weight Used for Protein Requirements: Current (0.8-1g)  Fluid (ml/day): 2031; Method Used for Fluid Requirements: 1 ml/kcal      Nutrition Related Findings:  labs and meds reviewed. Spoke w/ pt in room. Weighted about 238lb at last doctor appointment in May 2021. PTA, she had a very poor appetite for about 10 days. Recently started to eat alittle. PO intake 25% per patient. Pt asked for recipe ideas so her mom can help take care of her. Expected to d/c today. Wounds:    None       Current Nutrition Therapies:  DIET DIABETIC WITH OPTIONS Consistent Carb 1800kcal; Regular    Anthropometric Measures:  · Height:  5' 4\" (162.6 cm)  · Current Body Wt:  104.3 kg (230 lb)   · Admission Body Wt:       · Usual Body Wt:  108 kg (238 lb 1.6 oz) (5/2021)     · Ideal Body Wt:  120 lbs:  191.7 %   · Adjusted Body Weight:   ; Weight Adjustment for: No adjustment   · Adjusted BMI:       · BMI Category:  Obese class 2 (BMI 35.0-39. 9)       Nutrition Diagnosis:   · Inadequate oral intake related to impaired respiratory function as evidenced by intake 0-25%      Nutrition Interventions:   Food and/or Nutrient Delivery: Continue current diet, Start oral nutrition supplement  Nutrition Education and Counseling: No recommendations at this time  Coordination of Nutrition Care: Continue to monitor while inpatient, Interdisciplinary rounds    Goals:  Encourage PO intake >50% at most meals throughout the next 5-7 days       Nutrition Monitoring and Evaluation:   Behavioral-Environmental Outcomes: None identified  Food/Nutrient Intake Outcomes: Food and nutrient intake  Physical Signs/Symptoms Outcomes: Biochemical data, Skin, Weight, GI status, Nausea/vomiting    Discharge Planning:    Continue current diet     Electronically signed by Christina Grady on 5/27/2021 at 1:26 PM

## 2021-05-27 NOTE — DIABETES MGMT
GLYCEMIC CONTROL PLAN OF CARE        Diabetes Management:      Assessment: known h/o T2DM ~5 years, HbA1C not within recommended range for age + comorbids  - insulin initiated at diagnosis, regimen discontinued by patient  - pt reports GI side effects with Metformin, Victoza started ~ 2 weeks ago    Recommend: BG trending down, FBG in target range, increased mealtime insulin if continued glucose excursions  - recommend discharge pt on home regimen of Lantus & continuation of Victoza to address after meal BG  BG in target range (non-ICU\" < 180 ; -180):  [] Yes  [x] No      Steroids: no    TDD previous day = 53 (21 Lantus + 12 (4) MTI + 20 Humalog Very Insulin Resistant Corrective Coverage + 10 regular)    Addendum:  - pt will need prescriptions for the following items (insurance has covered Lantus Solostar pen in the past   Lantus Solostar Pen 20 units daily  BD Ultra-Fine Sarai Pen needles (100 count box) 4 mm (5/32\") x 32 G  NDC/[facility] [id number]  -pt needs glucometer to monitor BG at home  -the script should read  -Preferred Glucometer kit per insurance   -3 test strips/3 lancets per day  -300 test strips/300 lancets per month    Most recent blood glucose results:   Lab Results   Component Value Date/Time     (H) 05/27/2021 05:30 AM     (H) 05/26/2021 03:09 PM    GLUCPOC 180 (H) 05/27/2021 09:06 AM    GLUCPOC 148 (H) 05/27/2021 06:16 AM    GLUCPOC 208 (H) 05/26/2021 09:24 PM         Hypo: No    HbA1C: equivalent  to ave BGlucose of 351 mg/dl for 2-3 months prior to admission  Lab Results   Component Value Date/Time    Hemoglobin A1c 13.7 (H) 05/25/2021 03:20 AM       Adequate glycemic control PTA:  [] Yes  [x] No      Home diabetes medications:   Key Antihyperglycemic Medications     Patient is on no antihyperglycemic meds.         Goals:  Blood glucose will be within target range of 70 - 180 mg/dL by: 6/15    Diet:   Active Orders   Diet    DIET DIABETIC WITH OPTIONS Consistent Carb 1800kcal; Regular       Education:  ___X_ Refer to Diabetes Education Record                       _____ Education not indicated at this time    Constance Shi 87, RN, CDE  Glycemic Control Team  [phone]  Pager [phone] (M-TH 8:00-4:30P)  *After Hours pager [phone]

## 2021-05-27 NOTE — PROGRESS NOTES
Problem: Falls - Risk of  Goal: *Absence of Falls  Description: Document George Brown Fall Risk and appropriate interventions in the flowsheet. Outcome: Progressing Towards Goal  Note: Fall Risk Interventions:            Medication Interventions: Bed/chair exit alarm                   Problem: Patient Education: Go to Patient Education Activity  Goal: Patient/Family Education  Outcome: Progressing Towards Goal     Problem: General Medical Care Plan  Goal: *Vital signs within specified parameters  Outcome: Progressing Towards Goal  Goal: *Labs within defined limits  Outcome: Progressing Towards Goal     Problem: Falls - Risk of  Goal: *Absence of Falls  Description: Document Janie Fall Risk and appropriate interventions in the flowsheet.   Outcome: Progressing Towards Goal  Note: Fall Risk Interventions:            Medication Interventions: Bed/chair exit alarm                   Problem: Patient Education: Go to Patient Education Activity  Goal: Patient/Family Education  Outcome: Progressing Towards Goal

## 2021-05-27 NOTE — PROGRESS NOTES
Discharge Summary    Patient: Cristina Artis MRN: [id number]  CSN: [id number]    YOB: 1980  Age: 38 y.o. Sex: female    DOA: 5/24/2021 LOS:  LOS: 3 days   Discharge Date:      Primary Care Provider:  Imani Ivey MD    Admission Diagnoses: Hypoxia [R09.02]  Leukocytosis [D72.829]  Abnormal CXR [R93.89]    Discharge Diagnoses:    Problem List as of 5/27/2021 Date Reviewed: 5/25/2021        Codes Class Noted - Resolved    Hyperglycemia due to type 2 diabetes mellitus ([facility] ICD-10-CM: E11.65  ICD-9-CM: 250.00  5/25/2021 - Present        Noncompliance with medication regimen ICD-10-CM: Z91.14  ICD-9-CM: V15.81  5/25/2021 - Present        Failure of outpatient treatment ICD-10-CM: Z78.9  ICD-9-CM: V49.89  5/25/2021 - Present        Thrush ICD-10-CM: B37.0  ICD-9-CM: 112.0  5/25/2021 - Present        Morbid obesity ([facility] ICD-10-CM: E66.01  ICD-9-CM: 278.01  5/25/2021 - Present        Cholelithiasis ICD-10-CM: K80.20  ICD-9-CM: 574.20  5/25/2021 - Present        Hypertension ICD-10-CM: I10  ICD-9-CM: 401.9  5/25/2021 - Present        Severe dehydration ICD-10-CM: E86.0  ICD-9-CM: 276.51  5/25/2021 - Present        Hypokalemia ICD-10-CM: E87.6  ICD-9-CM: 276.8  5/25/2021 - Present        Leukocytosis ICD-10-CM: D72.829  ICD-9-CM: 288.60  5/24/2021 - Present        * (Principal) Hypoxia ICD-10-CM: R09.02  ICD-9-CM: 799.02  5/24/2021 - Present              Discharge Medications:     Current Discharge Medication List      START taking these medications    Details   insulin glargine (Lantus Solostar U-100 Insulin) 100 unit/mL (3 mL) inpn 25 Units by SubCUTAneous route nightly. Qty: 5 Pen, Refills: 2  Start date: 5/27/2021      doxycycline (MONODOX) 100 mg capsule Take 1 Capsule by mouth two (2) times a day for 7 days.   Qty: 14 Capsule, Refills: 0  Start date: 5/27/2021, End date: 6/3/2021      insulin lispro (HUMALOG) 100 unit/mL kwikpen Check blood sugars before meals  For Blood Sugar (mg/dL) of: Less than 150 =   0 units           150 -199 =   2 units  200 -249 =   4 units  250 -299 =   6 units  300 -349 =   8 units  350 and above =   10 units  Call MD  Qty: 1 Package, Refills: 2  Start date: 5/27/2021      Blood-Glucose Meter monitoring kit Check blood sugars ACHS  Qty: 1 Kit, Refills: 0  Start date: 5/27/2021      glucose blood VI test strips (blood glucose test) strip Check blood sugars ACHS  Qty: 100 Strip, Refills: 2  Start date: 5/27/2021      lancets misc Check blood sugars ACHS  Qty: 100 Each, Refills: 11  Start date: 5/27/2021             Discharge Condition: Good    Procedures : None    Consults: General Surgery      PHYSICAL EXAM   Visit Vitals  BP (!) 146/89   Pulse 77   Temp 97.7 °F (36.5 °C)   Resp 18   Ht 5' 4\" (1.626 m)   Wt 104.3 kg (230 lb)   SpO2 96%   Breastfeeding No   BMI 39.48 kg/m²     General: Awake, cooperative, no acute distress    HEENT: NC, Atraumatic. PERRLA, EOMI. Anicteric sclerae. Lungs:  CTA Bilaterally. No Wheezing/Rhonchi/Rales. Heart:  Regular  rhythm,  No murmur, No Rubs, No Gallops  Abdomen: Soft, Non distended, Non tender. +Bowel sounds,   Extremities: No c/c/e  Psych:   Not anxious or agitated. Neurologic:  No acute neurological deficits. Admission HPI :   Mauro Sanz is a 38 y.o. female with morbid obesity and uncontrolled type 2 diabetes mellitus with neuropathy presents with worsening shortness of breath, fatigue, and feeling poorly. She has had multiple ED visits to [facility] [place] . She left [place] from [facility] on 5/17. Her creatinine was 2.6 at that time with a white blood cell count of 21. She was discharged with metronidazole and cefdinir for presumed UTI but her urine culture was negative. She reports not having an appetite for the last 8 days but she denies vomiting or diarrhea. She takes care of her 2 children with special needs who are aged 3 and 11 and was continuing to worsen at home.   She had been off of her diabetic medications for the last 2 years but her PCP started her on Victoza monotherapy 2 weeks ago. About 2 days after she started the Victoza she started to feel poorly but does not regularly check her blood sugars.       Hospital Course :   Ms. Dayron Griggs was admitted to monitored floor, she was seen by general surgery. She did not had any acute events during hospitalization. Hypoxia -  She was started on oxygen by NC. Encouraged incentive spirometry  CT chest showed linear opacities at lung bases atelectasis vs infiltrate. Pneumonia -  Was started on zosyn and doxycycline. Will discharge on doxycycline for 7 day course. Leukocytosis -  Unclear, possible secondary to pneumonia. Blood cultures no growth to date  Urine cultures with yeast. Asymptomatic hence will not treat. CT abdomen with cholelithiasis, minimal infiltrative change along the pancreas is a subtle finding. Lipase in normal range. US abdomen with cholelithiasis, no cholecystitis. HIDA scan normal.  Seen by general surgery. Elevated liver enzymes -  Possibly secondary GB etiology, may have passed stone. Recommend follow up with PCP and follow up with liver enzymes, if persistently elevated, need GI referral.    DM -  Uncontrolled,   Started on basal and SSI along with ADA diet. She had improvement in her blood sugar. Will discharge on lantus and SSI before meals. Will stop victoza for now due to elevation in lever enzymes and CT findings of pancreas.     Morbid obesityBMI of 39.48  Aggressive lifestyle modification needed      Activity: Activity as tolerated    Diet: Diabetic Diet    Follow-up: PCP    Disposition: home    Minutes spent on discharge: 45       Labs: Results:       Chemistry Recent Labs     05/27/21  0530 05/26/21  1509 05/26/21  0257 05/25/21  0320   * 164* 200* 357*    139 138 134*   K 3.8 3.6 3.7 2.8*    108 106 99*   CO2 23 22 24 24   BUN 22* 24* 24* 32*   CREA 1.11 1. 06 1.10 1.19   CA 7.7* 8.3* 7.7* 7.8*   AGAP 8 9 8 11   BUCR 20 23* 22* 27*   *  --  820* 724*   TP 6.0*  --  5.8* 5.8*   ALB 1.5*  --  1.4* 1.5*   GLOB 4.5*  --  4.4* 4.3*   AGRAT 0.3*  --  0.3* 0.3*      CBC w/Diff Recent Labs     05/27/21  0530 05/26/21  0257 05/25/21  0320   WBC 17.4* 21.3* 22.0*   RBC 2.66* 2.79* 2.90*   HGB 8.2* 8.3* 8.7*   HCT 25.2* 25.8* 26.4*   * 411 360      Cardiac Enzymes No results for input(s): CPK, CKND1, FREDERICK in the last 72 hours. No lab exists for component: CKRMB, TROIP   Coagulation No results for input(s): PTP, INR, APTT, INREXT, INREXT in the last 72 hours. Lipid Panel No results found for: CHOL, CHOLPOCT, CHOLX, CHLST, CHOLV, 443841, HDL, HDLP, LDL, LDLC, DLDLP, 694153, VLDLC, VLDL, TGLX, TRIGL, TRIGP, TGLPOCT, CHHD, CHHDX   BNP No results for input(s): BNPP in the last 72 hours. Liver Enzymes Recent Labs     05/27/21  0530   TP 6.0*   ALB 1.5*   *      Thyroid Studies No results found for: T4, T3U, TSH, TSHEXT, TSHEXT         Significant Diagnostic Studies: NM HEPATOBILIARY DUCT SCAN    Result Date: 5/26/2021  -------------------------------------------------------------------------------- ---------------------------------------- HEPATOBILIARY SCINTIGRAPHY WITH GALLBLADDER EJECTION FRACTION: -------------------------------------------------------------------------------- ---------------------------------------- INDICATION: Right upper quadrant pain COMPARISON: Ultrasound of the right upper quadrant 5/24/2021; CT angiogram of the chest, abdomen, and pelvis 5/25/2021 RADIOPHARMACEUTICAL: 7.2 mCi Tc-99m mebrofenin IV INJECTION SITE: Right upper arm vein FINDINGS:  ------------------------------------------------------------------------------ There is normal uptake of radiopharmaceutical by the liver. The liver is of normal size and morphology.   There is prompt excretion of tracer by the liver with normal visualization of the intra-hepatic biliary ducts, the common hepatic duct, the gallbladder, the common bile duct, and the small bowel. ------------------------------------------------------------------------------    ------------------------------------------------------------------------------ 1. Normal filling of the gallbladder. ------------------------------------------------------------------------------    NM LUNG SCAN PERF    Result Date: 5/25/2021  VENTILATION SCAN INDICATION: Hypoxia. COMPARISON: None Available DESCRIPTION: After intravenous administration of 6.6 mCi 99mTc-MAA, perfusion images of the lungs were obtained in multiple projections. Images are correlated with chest radiographic study which demonstrate no evidence of focal pulmonary infiltrate or pleural effusion. The distribution of radiopharmaceutical is within normal limits on perfusion images. There is no evidence of moderate or large subsegmental perfusion defect to indicate the presence of pulmonary embolism. No evidence of pulmonary embolism. CT HEAD WO CONT    Result Date: 5/24/2021  EXAM: CT head INDICATION: Presyncope COMPARISON: None. TECHNIQUE: Axial CT imaging of the head was performed without intravenous contrast. One or more dose reduction techniques were used on this CT: automated exposure control, adjustment of the mAs and/or kVp according to patient size, and iterative reconstruction techniques. The specific techniques used on this CT exam have been documented in the patient's electronic medical record. Digital Imaging and Communications in Medicine (DICOM) format image data are available to nonaffiliated external healthcare facilities or entities on a secure, media free, reciprocally searchable basis with patient authorization for at least a 12-month period after this study. _______________ FINDINGS: BRAIN AND POSTERIOR FOSSA: The sulci, folia, ventricles and basal cisterns are within normal limits for the patient's age.  There is no intracranial hemorrhage, mass effect, or midline shift. There are no areas of abnormal parenchymal attenuation. EXTRA-AXIAL SPACES AND MENINGES: There are no abnormal extra-axial fluid collections. CALVARIUM: Intact. SINUSES: Clear. OTHER: None. _______________     No acute intracranial abnormalities. CT CHEST ABD PELV WO CONT    Result Date: 5/25/2021  EXAM: CT of the chest, abdomen, and pelvis INDICATION: Hypoxia. Pain. COMPARISON: None. TECHNIQUE: Axial CT imaging of the chest, abdomen, and pelvis was performed without intravenous contrast. Multiplanar reformats were generated. Dose reduction techniques used: automated exposure control, adjustment of the mAs and/or kVp according to patient size, and iterative reconstruction techniques. Digital imaging and communications in Medicine (DICOM) format image data are available to nonaffiliated external healthcare facilities or entities on a secure, media free, reciprocally searchable basis with patient authorization for at least 12 months after the study. _______________ FINDINGS: CHEST: LUNGS: There are linear opacities at both lung bases. The lungs are otherwise clear. PLEURA: Normal, with no effusion or pneumothorax. AIRWAY: Normal. MEDIASTINUM: The heart is mildly enlarged. There is minimal pericardial fluid. Given the limitations of cardiac motion, great vessels are unremarkable. LYMPH NODES: No enlarged lymph nodes. =============== ABDOMEN/PELVIS: LIVER, BILIARY: Liver is normal. No biliary dilation. Gallstones are noted. PANCREAS: There appears to be minimal infiltrative change along the pancreas. SPLEEN: Normal. ADRENALS: Normal. KIDNEYS: Normal. LYMPH NODES: No enlarged lymph nodes. GASTROINTESTINAL TRACT: No bowel dilation or wall thickening. PELVIC ORGANS: Unremarkable. VASCULATURE: Unremarkable. BONES: No acute or aggressive osseous abnormalities identified.  OTHER: None. _______________     Linear opacities at the lung bases most consistent with atelectasis and/or scarring. Cholelithiasis. Minimal infiltrative change along the pancreas is a subtle finding. Please correlate with pancreatic enzymes.  ABD LTD    Result Date: 5/24/2021  EXAM: Limited right upper quadrant abdominal ultrasound INDICATION: Elevated alkaline phosphatase and white count COMPARISON: None. TECHNIQUE: Real-time sonography in multiple planes of the [abdomen/right upper quadrant was performed with image documentation. Grayscale, color flow Doppler imaging, and velocity spectral waveform analysis of the portal vein was performed (duplex imaging). ] _______________ FINDINGS: LIVER: The liver [is echogenic suggesting hepatic steatosis, without focal mass.] Liver is enlarged in size, measuring 22.5 cm. Color flow Doppler and velocity spectral waveform analysis of the portal vein shows normal (hepatopedal) direction of flow. BILIARY SYSTEM: No intrahepatic biliary dilatation. Common bile duct is normal in caliber, measuring 4 mm. GALLBLADDER: There is sludge in the gallbladder along with two 4 mm calculi. There is no gallbladder wall thickening or pericholecystic fluid. [Negative sonographic Christine's sign per technologist's report.] RIGHT KIDNEY: Right kidney is normal in size, measuring 12.1 cm in length. No hydronephrosis or renal mass. No visible calculi. PANCREAS: Head and body are unremarkable in appearance though the tail is obscured by overlying bowel gas. IVC: Visualized portions are normal in caliber and patent. OTHER: No free intraperitoneal fluid. _______________     Sludge and gallstones in the gallbladder without evidence of acute cholecystitis. No acute or focal abnormality otherwise to explain patient's right upper quadrant pain.  Enlarged liver with hepatic steatosis    XR CHEST PORT    Result Date: 5/24/2021  EXAM:  AP Portable Chest X-ray 1 view INDICATION: Shortness of breath COMPARISON: None _______________ FINDINGS:  Heart and mediastinal contours are within normal limits for portable radiograph. Minimal bibasilar atelectasis or infiltrate seen. There is peribronchial thickening in the left lower lobe suggesting bronchitis. There are no pleural effusions. No acute osseous findings. ________________      Minimal bibasilar atelectasis or infiltrate with bronchitis. ECHO ADULT COMPLETE    Result Date: 5/25/2021  · Left Ventricle: Normal cavity size and systolic function (ejection fraction normal). Mild concentric hypertrophy. The estimated EF is 55 - 60%. There is mild (grade 1) left ventricular diastolic dysfunction E'E= 11.11. Wall Scoring: The left ventricular wall motion is normal. · Pulmonary Artery: Pulmonary arteries not well visualized. Pulmonary arterial systolic pressure (PASP) is 30 mmHg. Pulmonary hypertension found to be mild. · IVC/Hepatic Veins: Mildly dilated inferior vena cava. Moderately elevated central venous pressure (8 mmHg); IVC diameter is larger than 21 mm and collapses more than 50% with respiration. No results found for this or any previous visit. Please note that this dictation was completed with [employer], the computer voice recognition software. Quite often unanticipated grammatical, syntax, homophones, and other interpretive errors are inadvertently transcribed by the computer software. Please disregard these errors. Please excuse any errors that have escaped final proofreading.      CC: Ino Shepherd MD

## 2021-05-27 NOTE — PROGRESS NOTES
Problem: Diabetes Maintenance:Admission  Goal: Activity/Safety  Outcome: Progressing Towards Goal  Goal: Diagnostic Tests/Procedures  Outcome: Progressing Towards Goal  Goal: Nutrition  Outcome: Progressing Towards Goal  Goal: Medications  Outcome: Progressing Towards Goal  Goal: Treatments/Interventions/Procedures  Outcome: Progressing Towards Goal     Problem: Diabetes Maintenance:Ongoing  Goal: Activity/Safety  Outcome: Progressing Towards Goal  Goal: Nutrition  Outcome: Progressing Towards Goal  Goal: Medications  Outcome: Progressing Towards Goal  Goal: Treatments/Interventsions/Procedures  Outcome: Progressing Towards Goal  Goal: *Blood Glucose 80 to 180 md/dl  Outcome: Progressing Towards Goal     Problem: Diabetes Maintenance:Discharge Outcomes  Goal: *Describes follow-up/return visits to physicians  Outcome: Progressing Towards Goal  Goal: *Blood glucose at patient's target range or approaching  Outcome: Progressing Towards Goal  Goal: *Aware of nutrition guidelines  Outcome: Progressing Towards Goal  Goal: *Verbalizes information about medication  Description: Verbalizes name, dosage, time, side effects, and number of days to  continue medications. Outcome: Progressing Towards Goal  Goal: *Describes goals, rules, symptoms, and treatments  Description: Describes blood glucose goals, monitoring, sick day rules,  hypo/hyperglycemia prevention, symptoms, and treatment  Outcome: Progressing Towards Goal  Goal: *Describes available outpatient diabetes resources and support systems  Outcome: Progressing Towards Goal     Problem: Falls - Risk of  Goal: *Absence of Falls  Description: Document Janie Fall Risk and appropriate interventions in the flowsheet.   Outcome: Progressing Towards Goal  Note: Fall Risk Interventions:            Medication Interventions: Bed/chair exit alarm, Patient to call before getting OOB, Teach patient to arise slowly                   Problem: Patient Education: Go to Patient Education Activity  Goal: Patient/Family Education  Outcome: Progressing Towards Goal     Problem: Falls - Risk of  Goal: *Absence of Falls  Description: Document Cynthia Castellon Fall Risk and appropriate interventions in the flowsheet. Outcome: Progressing Towards Goal     Problem: Patient Education: Go to Patient Education Activity  Goal: Patient/Family Education  Outcome: Progressing Towards Goal     Problem: General Medical Care Plan  Goal: *Vital signs within specified parameters  Outcome: Progressing Towards Goal  Goal: *Labs within defined limits  Outcome: Progressing Towards Goal  Goal: *Absence of infection signs and symptoms  Outcome: Progressing Towards Goal  Goal: *Optimal pain control at patient's stated goal  Outcome: Progressing Towards Goal  Goal: *Skin integrity maintained  Outcome: Progressing Towards Goal  Goal: *Fluid volume balance  Outcome: Progressing Towards Goal  Goal: *Optimize nutritional status  Outcome: Progressing Towards Goal  Goal: *Anxiety reduced or absent  Outcome: Progressing Towards Goal  Goal: *Progressive mobility and function (eg: ADL's)  Outcome: Progressing Towards Goal     Problem: Patient Education: Go to Patient Education Activity  Goal: Patient/Family Education  Outcome: Progressing Towards Goal     Problem: Diabetes Self-Management  Goal: *Disease process and treatment process  Description: Define diabetes and identify own type of diabetes; list 3 options for treating diabetes. Outcome: Progressing Towards Goal  Goal: *Incorporating nutritional management into lifestyle  Description: Describe effect of type, amount and timing of food on blood glucose; list 3 methods for planning meals. Outcome: Progressing Towards Goal  Goal: *Incorporating physical activity into lifestyle  Description: State effect of exercise on blood glucose levels.   Outcome: Progressing Towards Goal  Goal: *Developing strategies to promote health/change behavior  Description: Define the ABC's of diabetes; identify appropriate screenings, schedule and personal plan for screenings. Outcome: Progressing Towards Goal  Goal: *Using medications safely  Description: State effect of diabetes medications on diabetes; name diabetes medication taking, action and side effects. Outcome: Progressing Towards Goal  Goal: *Monitoring blood glucose, interpreting and using results  Description: Identify recommended blood glucose targets  and personal targets. Outcome: Progressing Towards Goal  Goal: *Prevention, detection, treatment of acute complications  Description: List symptoms of hyper- and hypoglycemia; describe how to treat low blood sugar and actions for lowering  high blood glucose level. Outcome: Progressing Towards Goal  Goal: *Prevention, detection and treatment of chronic complications  Description: Define the natural course of diabetes and describe the relationship of blood glucose levels to long term complications of diabetes.   Outcome: Progressing Towards Goal  Goal: *Developing strategies to address psychosocial issues  Description: Describe feelings about living with diabetes; identify support needed and support network  Outcome: Progressing Towards Goal  Goal: *Insulin pump training  Outcome: Progressing Towards Goal  Goal: *Sick day guidelines  Outcome: Progressing Towards Goal  Goal: *Patient Specific Goal (EDIT GOAL, INSERT TEXT)  Outcome: Progressing Towards Goal     Problem: Patient Education: Go to Patient Education Activity  Goal: Patient/Family Education  Outcome: Progressing Towards Goal     Problem: Nutrition Deficit  Goal: *Optimize nutritional status  Outcome: Progressing Towards Goal     Problem: Pain  Goal: *Control of Pain  Outcome: Progressing Towards Goal  Goal: *PALLIATIVE CARE:  Alleviation of Pain  Outcome: Progressing Towards Goal     Problem: Patient Education: Go to Patient Education Activity  Goal: Patient/Family Education  Outcome: Progressing Towards Goal

## 2021-05-27 NOTE — PROGRESS NOTES
Discharge Planning: Home with family assist and MD follow-up    CM spoke with the patient at the bedside regarding plans for discharge. The patient states that her mother will be available to pick her up upon discharge. The patient denies any additional questions or concerns at this time. CM to follow the patient's progress and be available to assist with discharge planning as needed. CMS referral placed. Care Management Interventions  PCP Verified by CM: Yes  Mode of Transport at Discharge:  Other (see comment) (family)  Transition of Care Consult (CM Consult): Discharge Planning  Current Support Network: Own Home  Confirm Follow Up Transport: Family  The Plan for Transition of Care is Related to the Following Treatment Goals : Hypoxia  Discharge Location  Discharge Placement: Home with family assistance (vs home with hh)

## 2021-05-27 NOTE — DIABETES MGMT
Diabetes Patient/Family Education Record    Factors That May Influence Patients Ability to Learn or Comply with Recommendations   []   Language barrier    []   Cultural needs   []   Motivation    []   Cognitive limitation    []   Physical   []   Education    []   Physiological factors   []   Hearing/vision/speaking impairment   []   Baptist beliefs    []   Financial factors   []  Other:   [x]  No factors identified at this time. Person Instructed:   [x]   Patient   []   Family   []  Other     Preference for Learning:   [x]   Verbal   []   Written   []  Demonstration     Level of Comprehension & Competence:    [x]  Good                                      [] Fair                                     []  Poor                             []  Needs Reinforcement   [x]  Teach back completed    Education Component:   [x]  Medication management, including how to administer insulin (if appropriate) and potential medication interactions    []  Nutritional management - [] Obtained usual meal pattern   []   Basic carbohydrate counting  []  Plate method  []  Limit concentrated sweets and avoid sweetened beverages  []  Portion control  []    Avoid skipping meals   []  Exercise   []  Signs, symptoms, and treatment of hyperglycemia and hypoglycemia   [] Prevention, recognition and treatment of hyperglycemia and hypoglycemia   [x]  Importance of blood glucose monitoring  [x] Blood Glucose targets   [x]   Provided patient with blood glucose meter  []  Has glucometer and supplies at home; [employer] brochure   []  Instruction on use of the blood glucose meter and recommended monitoring schedule   [x]  Discuss the importance of HbA1C monitoring. Patients A1c is _13.7__ %.  This is equivalent to average glucose of 351 mg/dl for the past 2-3 months.   []  Sick day guidelines   []  Proper use and disposal of lancets, needles, syringes or insulin pens (if appropriate)   [] Potential long-term complications (retinopathy, kidney disease, neuropathy, foot care)   [] Information about whom to contact in case of emergency or for more information    [x]  Goal:  Patient/family will demonstrate understanding of Diabetes Self- Management Skills by: (date) ___6/30____  Plan for post-discharge education or self-management support:    [] Outpatient class schedule provided            [] Patient Declined    [] Scheduled for outpatient classes (date) _______    [] Written information provided  Verify: [x] Prior to admission Diabetes medications    Does patient understand how diabetes medications work? __________yes__________________  Does patient have difficulty obtaining diabetes medications or testing supplies? _________________     Mena Cabrera MS, RN, CDE  Glycemic Control Team  [phone]  Pager [phone] (M-TH 8:00-4:30P)  *After Hours pager [phone]

## 2021-05-27 NOTE — PROGRESS NOTES
Physician Progress Note      Alfredo Gonzalez  [facility] #:                  [id number]  :                       1980  ADMIT DATE:       2021 4:41 PM  [address] [place] DATE:  RESPONDING  PROVIDER #:        Paola Jin MD          QUERY TEXT:    Patient admitted with Hypoxia . Noted documentation of Acute Kidney Injury in  H&P and progress notes 0n 5-24 . In order to support the diagnosis of LAURA, please include additional clinical indicators in your documentation. Or please document if the diagnosis of LAURA has been ruled out after further study. The medical record reflects the following:  Risk Factors: infection ,dehydration  Clinical Indicators: On admission creatinine 1.38, 1.19, 1.14, 0n 5-26-creat  1.10. Treatment: 1000ml ns bolus IV    Defined by Kidney Disease Improving Global Outcomes (KDIGO) clinical practice guideline for acute kidney injury:  -Increase in SCr by greater than or equal to 0.3 mg/dl within 48 hours; or  -Increase or decrease in SCr to greater than or equal to 1.5 times baseline, which is known or presumed to have occurred within the prior 7 days; or  -Urine volume < 0.5ml/kg/h for 6 hours    Thank You  Eveline Donovan RN CDI CRCR  [phone]  Options provided:  -- Acute kidney injury evidenced by, Please document evidence as well as baseline creatinine, if known. -- Currently resolved acute kidney injury was evidenced by, Please document evidence as well as baseline creatinine, if known. -- Acute kidney injury ruled out after study  -- Other - I will add my own diagnosis  -- Disagree - Not applicable / Not valid  -- Disagree - Clinically unable to determine / Unknown  -- Refer to Clinical Documentation Reviewer    PROVIDER RESPONSE TEXT:    Acute kidney injury was ruled out after study. Query created by: Moises Weldon on 2021 11:47 AM      QUERY TEXT:    Patient admitted with Hypoxia , noted to have  treatment for pneumonia .  If possible, please document in progress notes and discharge summary if you are evaluating and/or treating any of the following: The medical record reflects the following:  Risk Factors: Hypoxic  Clinical Indicators: CXR: Minimal bibasilar atelectasis or infiltrate with bronchitis. WBC 24.8, Leukocytosis with failure of outpatient treatment-likely due to pneumonia  Treatment: vibramycin, zzithromycin , NS IVF boluses    Thank- You  Louisa Khanna RN [facility] CRCR  [phone]  Options provided:  -- Pneumonia  -- Pneumonia ruled out  -- Other - I will add my own diagnosis  -- Disagree - Not applicable / Not valid  -- Disagree - Clinically unable to determine / Unknown  -- Refer to Clinical Documentation Reviewer    PROVIDER RESPONSE TEXT:    Provider is clinically unable to determine a response to this query.     Query created by: Smiley Ordonez on 5/26/2021 12:02 PM      Electronically signed by:  Laure Maxwell MD 5/27/2021 1:05 PM

## 2021-05-27 NOTE — PROGRESS NOTES
Pt with leukocytosis of unknown etiology. Stones by US but no findings of inflammation - which would be extremely obvious with a WBC of 24 on admission  HIDA normal.  Stones are not the cause of pt WBC - no acute cholecystitis  No other concerning GI findings. No surgical indication     Surgery signing off.

## 2021-05-27 NOTE — ROUTINE PROCESS
Bedside shift change report given to [address] d rn (oncoming nurse) by roque Acosta rn (offgoing nurse). Report included the following information SBAR, Kardex, Intake/Output and MAR.

## 2021-05-29 ENCOUNTER — HOSPITAL ENCOUNTER (EMERGENCY)
Age: 41
Discharge: HOME | End: 2021-05-29
Attending: STUDENT IN AN ORGANIZED HEALTH CARE EDUCATION/TRAINING PROGRAM
Payer: MEDICAID

## 2021-05-29 ENCOUNTER — APPOINTMENT (OUTPATIENT)
Dept: GENERAL RADIOLOGY | Age: 41
End: 2021-05-29
Attending: STUDENT IN AN ORGANIZED HEALTH CARE EDUCATION/TRAINING PROGRAM
Payer: MEDICAID

## 2021-05-29 VITALS
SYSTOLIC BLOOD PRESSURE: 162 MMHG | HEIGHT: 64 IN | WEIGHT: 249 LBS | RESPIRATION RATE: 20 BRPM | TEMPERATURE: 99 F | OXYGEN SATURATION: 97 % | DIASTOLIC BLOOD PRESSURE: 91 MMHG | BODY MASS INDEX: 42.51 KG/M2 | HEART RATE: 75 BPM

## 2021-05-29 DIAGNOSIS — I15.9 SECONDARY HYPERTENSION: ICD-10-CM

## 2021-05-29 DIAGNOSIS — E11.65 TYPE 2 DIABETES MELLITUS WITH HYPERGLYCEMIA, UNSPECIFIED WHETHER LONG TERM INSULIN USE (HCC): ICD-10-CM

## 2021-05-29 DIAGNOSIS — R60.0 LOWER EXTREMITY EDEMA: Primary | ICD-10-CM

## 2021-05-29 LAB
ANION GAP SERPL CALC-SCNC: 9 MMOL/L (ref 3–18)
ATRIAL RATE: 78 BPM
BASOPHILS # BLD: 0.1 K/UL (ref 0–0.1)
BASOPHILS NFR BLD: 0 % (ref 0–2)
BNP SERPL-MCNC: 7415 PG/ML (ref 0–450)
BUN SERPL-MCNC: 14 MG/DL (ref 7–18)
BUN/CREAT SERPL: 15 (ref 12–20)
CALCIUM SERPL-MCNC: 8.9 MG/DL (ref 8.5–10.1)
CALCULATED P AXIS, ECG09: 9 DEGREES
CALCULATED R AXIS, ECG10: -4 DEGREES
CALCULATED T AXIS, ECG11: 47 DEGREES
CHLORIDE SERPL-SCNC: 105 MMOL/L (ref 100–111)
CO2 SERPL-SCNC: 23 MMOL/L (ref 21–32)
CREAT SERPL-MCNC: 0.96 MG/DL (ref 0.6–1.3)
DIAGNOSIS, 93000: NORMAL
DIFFERENTIAL METHOD BLD: ABNORMAL
EOSINOPHIL # BLD: 0.4 K/UL (ref 0–0.4)
EOSINOPHIL NFR BLD: 3 % (ref 0–5)
ERYTHROCYTE [DISTWIDTH] IN BLOOD BY AUTOMATED COUNT: 15 % (ref 11.6–14.5)
GLUCOSE SERPL-MCNC: 237 MG/DL (ref 74–99)
HCT VFR BLD AUTO: 28.8 % (ref 35–45)
HGB BLD-MCNC: 9.3 G/DL (ref 12–16)
LYMPHOCYTES # BLD: 1.8 K/UL (ref 0.9–3.6)
LYMPHOCYTES NFR BLD: 12 % (ref 21–52)
MCH RBC QN AUTO: 30.3 PG (ref 24–34)
MCHC RBC AUTO-ENTMCNC: 32.3 G/DL (ref 31–37)
MCV RBC AUTO: 93.8 FL (ref 74–97)
MONOCYTES # BLD: 0.8 K/UL (ref 0.05–1.2)
MONOCYTES NFR BLD: 6 % (ref 3–10)
NEUTS SEG # BLD: 11.8 K/UL (ref 1.8–8)
NEUTS SEG NFR BLD: 79 % (ref 40–73)
P-R INTERVAL, ECG05: 134 MS
PLATELET # BLD AUTO: 584 K/UL (ref 135–420)
PMV BLD AUTO: 10.2 FL (ref 9.2–11.8)
POTASSIUM SERPL-SCNC: 3.8 MMOL/L (ref 3.5–5.5)
Q-T INTERVAL, ECG07: 412 MS
QRS DURATION, ECG06: 102 MS
QTC CALCULATION (BEZET), ECG08: 469 MS
RBC # BLD AUTO: 3.07 M/UL (ref 4.2–5.3)
SODIUM SERPL-SCNC: 137 MMOL/L (ref 136–145)
TROPONIN I SERPL-MCNC: 0.08 NG/ML (ref 0–0.04)
VENTRICULAR RATE, ECG03: 78 BPM
WBC # BLD AUTO: 15 K/UL (ref 4.6–13.2)

## 2021-05-29 PROCEDURE — 74011250637 HC RX REV CODE- 250/637: Performed by: STUDENT IN AN ORGANIZED HEALTH CARE EDUCATION/TRAINING PROGRAM

## 2021-05-29 PROCEDURE — 74011250636 HC RX REV CODE- 250/636: Performed by: STUDENT IN AN ORGANIZED HEALTH CARE EDUCATION/TRAINING PROGRAM

## 2021-05-29 PROCEDURE — 99285 EMERGENCY DEPT VISIT HI MDM: CPT

## 2021-05-29 PROCEDURE — 85025 COMPLETE CBC W/AUTO DIFF WBC: CPT

## 2021-05-29 PROCEDURE — 93005 ELECTROCARDIOGRAM TRACING: CPT

## 2021-05-29 PROCEDURE — 80048 BASIC METABOLIC PNL TOTAL CA: CPT

## 2021-05-29 PROCEDURE — 84484 ASSAY OF TROPONIN QUANT: CPT

## 2021-05-29 PROCEDURE — 96374 THER/PROPH/DIAG INJ IV PUSH: CPT

## 2021-05-29 PROCEDURE — 83880 ASSAY OF NATRIURETIC PEPTIDE: CPT

## 2021-05-29 PROCEDURE — 71045 X-RAY EXAM CHEST 1 VIEW: CPT

## 2021-05-29 RX ORDER — GUAIFENESIN 100 MG/5ML
324 LIQUID (ML) ORAL
Status: COMPLETED | OUTPATIENT
Start: 2021-05-29 | End: 2021-05-29

## 2021-05-29 RX ORDER — FUROSEMIDE 10 MG/ML
40 INJECTION INTRAMUSCULAR; INTRAVENOUS
Status: COMPLETED | OUTPATIENT
Start: 2021-05-29 | End: 2021-05-29

## 2021-05-29 RX ORDER — FUROSEMIDE 40 MG/1
40 TABLET ORAL DAILY
Qty: 5 TABLET | Refills: 0 | Status: SHIPPED | OUTPATIENT
Start: 2021-05-29 | End: 2021-06-03

## 2021-05-29 RX ORDER — POTASSIUM CHLORIDE 20 MEQ/1
20 TABLET, EXTENDED RELEASE ORAL 2 TIMES DAILY
Qty: 9 TABLET | Refills: 0 | Status: SHIPPED | OUTPATIENT
Start: 2021-05-29 | End: 2021-06-03

## 2021-05-29 RX ADMIN — ASPIRIN 324 MG: 81 TABLET, CHEWABLE ORAL at 17:14

## 2021-05-29 RX ADMIN — FUROSEMIDE 40 MG: 10 INJECTION, SOLUTION INTRAMUSCULAR; INTRAVENOUS at 18:11

## 2021-05-29 NOTE — DISCHARGE INSTRUCTIONS
Please return to the ER with any new or worsening symptoms or any other concerns. Please return to the Emergency Department if you develop a fever, chills, cannot eat or drink due to nausea or vomiting, or if any of your symptoms worsen. Please follow up with PCP on Tuesday or Wednesday    Also, It is extremely important that you follow-up with a primary care physician and if you do not have one currently use the contact information provided to obtain an appointment. If none was provided please call the number on the back of your insurance card to locate a Primary care doctor. Many offices have \"cancellation lists\" that you can ask to be placed on; should a patient with an earlier appointment cancel you will be notified to take their place. Please return to the Emergency Room immediately if your symptoms worsen. Please carefully read all discharge instructions    [email or web address]    What are GoodRx coupons? GoodRx coupons will help you pay less than the cash price for your prescription. [employer] free to use and are accepted at virtually every [place] pharmacy. Your pharmacist will know how to enter the codes on the coupon to pull up the lowest discount available.

## 2021-05-29 NOTE — ED PROVIDER NOTES
EMERGENCY DEPARTMENT HISTORY AND PHYSICAL EXAM      Date: 5/29/2021  Patient Name: Bryanna Cedillo    History of Presenting Illness     Chief Complaint   Patient presents with    Weight Gain    Leg Swelling     History Provided By: Patient    HPI:  Bryanna Cedillo is a 38 y.o. female with with morbid obesity and uncontrolled type 2 diabetes mellitus with neuropathy presents with weight gain, bilateral lower extremity edema and dyspnea on exertion. She was discharged home Thursday after a hospital stay here at [facility]. Previously she had worsening shortness of breath, fatigue, and feeling poorly.      She was admitted at that time, noted to have uncontrolled diabetes, was started on basal bolus and sliding scale insulin, had elevated liver enzymes without a significant work-up other than cholelithiasis, HIDA scan was normal, evaluated at the time by general surgery. She was discharged on a 7-day doxycycline course for acute cystitis. Today she has no complaints of chest pain, nausea, vomiting, dysuria. She states that for much of her stay she was on IV fluids, and believes that was contributing to her edema. The patient denies any aggravating or alleviating factors - and has not taken any medications in an attempt to alleviate her symptoms. PMH, PSH, family history, social history, allergies reviewed with the patient with significant items noted above. ---  Pregnancy -unknown    PCP: Tiffany Gant MD    Current Outpatient Medications   Medication Sig Dispense Refill    furosemide (Lasix) 40 mg tablet Take 1 Tablet by mouth daily for 5 days. 5 Tablet 0    potassium chloride (K-DUR, KLOR-CON) 20 mEq tablet Take 1 Tablet by mouth two (2) times a day for 5 days. 9 Tablet 0    insulin glargine (Lantus Solostar U-100 Insulin) 100 unit/mL (3 mL) inpn 25 Units by SubCUTAneous route nightly.  5 Pen 2    doxycycline (MONODOX) 100 mg capsule Take 1 Capsule by mouth two (2) times a day for 7 days. 14 Capsule 0    insulin lispro (HUMALOG) 100 unit/mL kwikpen Check blood sugars before meals  For Blood Sugar (mg/dL) of:     Less than 150 =   0 units           150 -199 =   2 units  200 -249 =   4 units  250 -299 =   6 units  300 -349 =   8 units  350 and above =   10 units  Call MD 1 Package 2    Blood-Glucose Meter monitoring kit Check blood sugars ACHS 1 Kit 0    glucose blood VI test strips (blood glucose test) strip Check blood sugars ACHS 100 Strip 2    lancets misc Check blood sugars ACHS 100 Each 11       Past History     Past Medical History:  Past Medical History:   Diagnosis Date    Diabetes [facility] [address]     Hypertension     Hypothyroidism     Hypothyroidism     Morbid obesity ([facility] [address]     Neuropathy     Thrush        Past Surgical History:  Past Surgical History:   Procedure Laterality Date    HX TONSILLECTOMY         Family History:  Family History   Problem Relation Age of Onset    Asthma Mother     Hypertension Mother     Asthma Brother     Diabetes Maternal Grandmother     Hypertension Maternal Grandmother     Cancer Paternal Grandfather        Social History:  Social History     Tobacco Use    Smoking status: Never Smoker    Smokeless tobacco: Never Used   Substance Use Topics    Alcohol use: Never    Drug use: Never       Allergies:   Allergies   Allergen Reactions    Codeine Itching    Gabapentin Other (comments)     \"Makes my skin crawl\"    Lyrica [Pregabalin] Other (comments)     \"Makes my skin crawl\"       Review of Systems   Review of Systems    In addition to that documented in the HPI above  All other review of systems negative    Constitutional: Denies fevers or chills  Eyes: Denies vision changes  ENMT: Denies sore throat  CV: Denies chest pain  GI: Denies vomiting or diarrhea  : Denies painful urination  MSK: Denies recent trauma  Skin: Denies new rashes  Neuro: Denies new numbness or tingling or weakness  Endocrine: Denies polyuria  Heme: Denies bleeding disorders    Physical Exam     Vitals:    05/29/21 1630 05/29/21 1700 05/29/21 1730 05/29/21 1800   BP: (!) 158/89 (!) 178/90 (!) 168/90 (!) 162/91   Pulse: 74 77 75 75   Resp: 19 20 18 20   Temp:       SpO2: 96% 97% 96% 97%   Weight:       Height:         Physical Exam    Nursing notes and vital signs reviewed    General: Patient is awake and alert, resting comfortably in no acute distress  Head: Normocephalic and atraumatic  Eyes: Extraocular muscles intact, no conjunctival pallor  Ear, nose, throat: Normal external exam  Neck: Normal range of motion  Cardiovascular: RRR, no murmur auscultated, warm, well-perfused extremities  Respiratory: Patient is in no respiratory distress, lungs CTAB  GI: soft, non-tender, non-distended  MSK: No gross deformities appreciated  Extremities: pulses intact with good cap refills, bilateral lower extremity edema noted  Skin: Warm, dry, and intact  Neuro: The patient is alert and oriented, no gross motor or sensory defects noted. Psych: Appropriate mood and affect. Medical Decision Making   I am the first provider for this patient. I reviewed the vital signs, available nursing notes, past medical history, past surgical history, family history and social history. Vital Signs-Reviewed the patient's vital signs. Visit Vitals  BP (!) 162/91   Pulse 75   Temp 99 °F (37.2 °C)   Resp 20   Ht 5' 4\" (1.626 m)   Wt 112.9 kg (249 lb)   LMP 05/20/2021 (Exact Date)   SpO2 97%   BMI 42.74 kg/m²       Pulse Oximetry Analysis - 97% on room air      Cardiac Monitor:  Rate: 75 bpm  Rhythm: NSR    EKG interpretation: (Preliminary)  Interpreted by the EP. EKG non diagnostic, without acute ischemic changes, arrhythmia, prolonged QT, or evidence of Brugada      Provider Notes (Medical Decision Making):   Coleen Mao is a 38 y.o. female edema, hypokalemia, likely mild new onset diastolic heart failure due to hypervolemia.   No chest pain or shortness of breath, do not suspect ACS.    Procedures:  Procedures    ED Course:   ED Course as of Jun 01 1920   Sat May 29, 2021   1814 6:14 PM   Feeling much better at this time. [DM]      ED Course User Index  [DM] Marcelina Stephens MD      Discussed with dr. Brisa Weaver, agrees patient can be discharged home with lasix, potassium    Will discharge after diuresis here. Shared decision making with patient, she understands the need for close follow-up, cardiology, PCP. She is electing to go home and diurese at home, shared decision making would not repeat troponins, patient has no symptoms of ACS. The patient had a prolonged stay for extensive diagnostics, interventions, observation, serial exams and treatment that were necessary for the diagnosis, treatment and safety of the patient due to their extensive chronic illness. Will discharge home with Lasix, potassium. She is comfortable with the plan and discharge at this time. Expressed the importance of follow up for current symptoms and she agrees and was advised on what signs/symptoms to return immediately to the ER. [place]  results have been reviewed with her. She has been counseled regarding her diagnosis, treatment, and plan. She verbally conveys understanding and agreement of the signs, symptoms, diagnosis, treatment and prognosis and additionally agrees to follow up as discussed. She also agrees with the care-plan and conveys that all of her questions have been answered. I have also provided discharge instructions for her that include: educational information regarding their diagnosis and treatment, and list of reasons why they would want to return to the ED prior to their follow-up appointment, should her condition change.          Vitals Review/addressed -     Diagnostic Study Results     Orders Placed This Encounter    XR CHEST PORT     Standing Status:   Standing     Number of Occurrences:   1     Order Specific Question:   Reason for Exam Answer:   edema     Order Specific Question:   Is Patient Pregnant? Answer:   Unknown    BASIC METABOLIC PANEL     Standing Status:   Standing     Number of Occurrences:   1    CBC WITH AUTOMATED DIFF     Standing Status:   Standing     Number of Occurrences:   1    PRO-BNP     Standing Status:   Standing     Number of Occurrences:   1    TROPONIN I     Standing Status:   Standing     Number of Occurrences:   1    EKG, 12 LEAD, INITIAL     Standing Status:   Standing     Number of Occurrences:   1     Order Specific Question:   Reason for Exam:     Answer:   SOB    EKG, 12 LEAD, INITIAL     Standing Status:   Standing     Number of Occurrences:   1     Order Specific Question:   Reason for Exam:     Answer:   weight gain    aspirin chewable tablet 324 mg    furosemide (LASIX) injection 40 mg    furosemide (Lasix) 40 mg tablet     Sig: Take 1 Tablet by mouth daily for 5 days. Dispense:  5 Tablet     Refill:  0    potassium chloride (K-DUR, KLOR-CON) 20 mEq tablet     Sig: Take 1 Tablet by mouth two (2) times a day for 5 days. Dispense:  9 Tablet     Refill:  0       Labs -     No results found for this or any previous visit (from the past 12 hour(s)). Radiologic Studies -   XR CHEST PORT   Final Result   Platelike atelectasis at both lung bases with blunting left   costophrenic angle suggesting small effusion and/or airspace disease. CT Results  (Last 48 hours)    None        CXR Results  (Last 48 hours)    None          Disposition     Disposition:  Home    CLINICAL IMPRESSION:    1. Lower extremity edema    2. Type 2 diabetes mellitus with hyperglycemia, unspecified whether long term insulin use ([facility] [address]    3.  Secondary hypertension        It should be noted that I will be the provider of record for this patient  Jc Benítez MD    Follow-up Information     Follow up With Specialties Details Why MD [address], [place] [id number] [address]  [phone]      [facility] EMERGENCY DEPT Emergency Medicine Go to  If symptoms worsen [address] [place] [id number]  [phone]          Discharge Medication List as of 5/29/2021  6:13 PM      START taking these medications    Details   furosemide (Lasix) 40 mg tablet Take 1 Tablet by mouth daily for 5 days. , Print, Disp-5 Tablet, R-0      potassium chloride (K-DUR, KLOR-CON) 20 mEq tablet Take 1 Tablet by mouth two (2) times a day for 5 days. , Print, Disp-9 Tablet, R-0         CONTINUE these medications which have NOT CHANGED    Details   insulin glargine (Lantus Solostar U-100 Insulin) 100 unit/mL (3 mL) inpn 25 Units by SubCUTAneous route nightly. , Print, Disp-5 Pen, R-2      doxycycline (MONODOX) 100 mg capsule Take 1 Capsule by mouth two (2) times a day for 7 days. , Print, Disp-14 Capsule, R-0      insulin lispro (HUMALOG) 100 unit/mL kwikpen Check blood sugars before meals  For Blood Sugar (mg/dL) of:     Less than 150 =   0 units           150 -199 =   2 units  200 -249 =   4 units  250 -299 =   6 units  300 -349 =   8 units  350 and above =   10 units  Call MD, Print, Disp-1 Package, R-2      Blood-Glucose Meter monitoring kit Check blood sugars ACHS, Print, Disp-1 Kit, R-0      glucose blood VI test strips (blood glucose test) strip Check blood sugars ACHS, Print, Disp-100 Strip, R-2      lancets misc Check blood sugars ACHS, Print, Disp-100 Each, R-11             Please note that this dictation was completed with [employer], the [employer] voice recognition software. Quite often unanticipated grammatical, syntax, homophones, and other interpretive errors are inadvertently transcribed by the computer software. Please disregard these errors. Please excuse any errors that have escaped final proofreading.

## 2021-05-29 NOTE — ED TRIAGE NOTES
Pt to ED for generalized body aches, BLE swelling and exertional dyspnea, and weight gain of 19lb since leaving hospital s/p admission this past week. Pt reports admission on Monday for UTI, pneumonia, and elevated BG levels.

## 2021-05-29 NOTE — DISCHARGE SUMMARY
Edward Walker MD   Physician   FAMILY MEDICINE   Progress Notes       Signed   Date of Service:  05/27/21 1639                     Discharge Summary     Patient: Cristina Artis MRN: [id number]  CSN: [id number]    YOB: 1980  Age: 38 y.o. Sex: female    DOA: 5/24/2021 LOS:  LOS: 3 days   Discharge Date:       Primary Care Provider:  Imani Ivey MD     Admission Diagnoses: Hypoxia [R09.02]  Leukocytosis [D72.829]  Abnormal CXR [R93.89]     Discharge Diagnoses:              Problem List as of 5/27/2021 Date Reviewed: 5/25/2021         Codes Class Noted - Resolved     Hyperglycemia due to type 2 diabetes mellitus ([facility] ICD-10-CM: E11.65  ICD-9-CM: 250.00   5/25/2021 - Present           Noncompliance with medication regimen ICD-10-CM: Z91.14  ICD-9-CM: V15.81   5/25/2021 - Present           Failure of outpatient treatment ICD-10-CM: Z78.9  ICD-9-CM: V49.89   5/25/2021 - Present           Thrush ICD-10-CM: B37.0  ICD-9-CM: 112.0   5/25/2021 - Present           Morbid obesity ([facility] ICD-10-CM: E66.01  ICD-9-CM: 278.01   5/25/2021 - Present           Cholelithiasis ICD-10-CM: K80.20  ICD-9-CM: 574.20   5/25/2021 - Present           Hypertension ICD-10-CM: I10  ICD-9-CM: 254. 9   5/25/2021 - Present           Severe dehydration ICD-10-CM: E86.0  ICD-9-CM: 276.51   5/25/2021 - Present           Hypokalemia ICD-10-CM: E87.6  ICD-9-CM: 276.8   5/25/2021 - Present           Leukocytosis ICD-10-CM: D72.829  ICD-9-CM: 288.60   5/24/2021 - Present           * (Principal) Hypoxia ICD-10-CM: R09.02  ICD-9-CM: 799.02   5/24/2021 - Present                   Discharge Medications:         Current Discharge Medication List           START taking these medications     Details   insulin glargine (Lantus Solostar U-100 Insulin) 100 unit/mL (3 mL) inpn 25 Units by SubCUTAneous route nightly.   Qty: 5 Pen, Refills: 2  Start date: 5/27/2021       doxycycline (MONODOX) 100 mg capsule Take 1 Capsule by mouth two (2) times a day for 7 days. Qty: 14 Capsule, Refills: 0  Start date: 5/27/2021, End date: 6/3/2021       insulin lispro (HUMALOG) 100 unit/mL kwikpen Check blood sugars before meals  For Blood Sugar (mg/dL) of:     Less than 150 =   0 units           150 -199 =   2 units  200 -249 =   4 units  250 -299 =   6 units  300 -349 =   8 units  350 and above =   10 units  Call MD  Qty: 1 Package, Refills: 2  Start date: 5/27/2021       Blood-Glucose Meter monitoring kit Check blood sugars ACHS  Qty: 1 Kit, Refills: 0  Start date: 5/27/2021       glucose blood VI test strips (blood glucose test) strip Check blood sugars ACHS  Qty: 100 Strip, Refills: 2  Start date: 5/27/2021       lancets misc Check blood sugars ACHS  Qty: 100 Each, Refills: 11  Start date: 5/27/2021                 Discharge Condition: Good     Procedures : None     Consults: General Surgery        PHYSICAL EXAM   Visit Vitals  BP (!) 146/89   Pulse 77   Temp 97.7 °F (36.5 °C)   Resp 18   Ht 5' 4\" (1.626 m)   Wt 104.3 kg (230 lb)   SpO2 96%   Breastfeeding No   BMI 39.48 kg/m²      General:          Awake, cooperative, no acute distress    HEENT:           NC, Atraumatic. PERRLA, EOMI. Anicteric sclerae. Lungs:             CTA Bilaterally. No Wheezing/Rhonchi/Rales. Heart:              Regular  rhythm,  No murmur, No Rubs, No Gallops  Abdomen:        Soft, Non distended, Non tender. +Bowel sounds,   Extremities:     No c/c/e  Psych:   Not anxious or agitated.   Neurologic:       No acute neurological deficits.                                       Admission HPI :   Pawan Blackman is a 38 y.o. female with morbid obesity and uncontrolled type 2 diabetes mellitus with neuropathy presents with worsening shortness of breath, fatigue, and feeling poorly.  She has had multiple ED visits to [facility] .  She left AMA from [facility] on 5/17. Perez Nails creatinine was 2.6 at that time with a white blood cell count of 21.  She was discharged with metronidazole and cefdinir for presumed UTI but her urine culture was negative. Jason Robert reports not having an appetite for the last 8 days but she denies vomiting or diarrhea.  She takes care of her 2 children with special needs who are aged 3 and 5 and was continuing to worsen at home. Jason Robert had been off of her diabetic medications for the last 2 years but her PCP started her on Victoza monotherapy 2 weeks ago. David Hager 2 days after she started the Victoza she started to feel poorly but does not regularly check her blood sugars.        Hospital Course :   Ms. Ni Gloria was admitted to monitored floor, she was seen by general surgery. She did not had any acute events during hospitalization.      Hypoxia -  She was started on oxygen by NC. Encouraged incentive spirometry  CT chest showed linear opacities at lung bases atelectasis vs infiltrate.     Pneumonia -  Was started on zosyn and doxycycline. Will discharge on doxycycline for 7 day course.      Leukocytosis -  Unclear, possible secondary to pneumonia. Blood cultures no growth to date  Urine cultures with yeast. Asymptomatic hence will not treat. CT abdomen with cholelithiasis, minimal infiltrative change along the pancreas is a subtle finding. Lipase in normal range. US abdomen with cholelithiasis, no cholecystitis. HIDA scan normal.  Seen by general surgery.     Elevated liver enzymes -  Possibly secondary GB etiology, may have passed stone. Recommend follow up with PCP and follow up with liver enzymes, if persistently elevated, need GI referral.     DM -  Uncontrolled,   Started on basal and SSI along with ADA diet. She had improvement in her blood sugar. Will discharge on lantus and SSI before meals.    Will stop victoza for now due to elevation in lever enzymes and CT findings of pancreas.     Morbid obesityBMI of 39.48  Aggressive lifestyle modification needed        Activity: Activity as tolerated     Diet: Diabetic Diet     Follow-up: PCP     Disposition: home     Minutes spent on discharge: 45         Labs: Results:         Chemistry        Recent Labs     05/27/21  0530 05/26/21  1509 05/26/21  0257 05/25/21  0320   * 164* 200* 357*    139 138 134*   K 3.8 3.6 3.7 2.8*    108 106 99*   CO2 23 22 24 24   BUN 22* 24* 24* 32*   CREA 1.11 1.06 1.10 1.19   CA 7.7* 8.3* 7.7* 7.8*   AGAP 8 9 8 11   BUCR 20 23* 22* 27*   *  --  820* 724*   TP 6.0*  --  5.8* 5.8*   ALB 1.5*  --  1.4* 1.5*   GLOB 4.5*  --  4.4* 4.3*   AGRAT 0.3*  --  0.3* 0.3*       CBC w/Diff       Recent Labs     05/27/21  0530 05/26/21  0257 05/25/21  0320   WBC 17.4* 21.3* 22.0*   RBC 2.66* 2.79* 2.90*   HGB 8.2* 8.3* 8.7*   HCT 25.2* 25.8* 26.4*   * 411 360       Cardiac Enzymes No results for input(s): CPK, CKND1, FREDERICK in the last 72 hours.     No lab exists for component: CKRMB, TROIP   Coagulation No results for input(s): PTP, INR, APTT, INREXT, INREXT in the last 72 hours. Lipid Panel No results found for: CHOL, CHOLPOCT, CHOLX, CHLST, CHOLV, 343212, HDL, HDLP, LDL, LDLC, DLDLP, 638348, VLDLC, VLDL, TGLX, TRIGL, TRIGP, TGLPOCT, CHHD, CHHDX   BNP No results for input(s): BNPP in the last 72 hours.    Liver Enzymes Recent Labs     05/27/21  0530   TP 6.0*   ALB 1.5*   *       Thyroid Studies No results found for: T4, T3U, TSH, TSHEXT, TSHEXT           Significant Diagnostic Studies: NM HEPATOBILIARY DUCT SCAN     Result Date: 5/26/2021  -------------------------------------------------------------------------------- ---------------------------------------- HEPATOBILIARY SCINTIGRAPHY WITH GALLBLADDER EJECTION FRACTION: -------------------------------------------------------------------------------- ---------------------------------------- INDICATION: Right upper quadrant pain COMPARISON: Ultrasound of the right upper quadrant 5/24/2021; CT angiogram of the chest, abdomen, and pelvis 5/25/2021 RADIOPHARMACEUTICAL: 7.2 mCi Tc-99m mebrofenin IV INJECTION SITE: Right upper arm vein FINDINGS:  ------------------------------------------------------------------------------ There is normal uptake of radiopharmaceutical by the liver. The liver is of normal size and morphology. There is prompt excretion of tracer by the liver with normal visualization of the intra-hepatic biliary ducts, the common hepatic duct, the gallbladder, the common bile duct, and the small bowel. ------------------------------------------------------------------------------     ------------------------------------------------------------------------------ 1. Normal filling of the gallbladder. ------------------------------------------------------------------------------     NM LUNG SCAN PERF     Result Date: 5/25/2021  VENTILATION SCAN INDICATION: Hypoxia. COMPARISON: None Available DESCRIPTION: After intravenous administration of 6.6 mCi 99mTc-MAA, perfusion images of the lungs were obtained in multiple projections. Images are correlated with chest radiographic study which demonstrate no evidence of focal pulmonary infiltrate or pleural effusion. The distribution of radiopharmaceutical is within normal limits on perfusion images. There is no evidence of moderate or large subsegmental perfusion defect to indicate the presence of pulmonary embolism.       No evidence of pulmonary embolism.       CT HEAD WO CONT     Result Date: 5/24/2021  EXAM: CT head INDICATION: Presyncope COMPARISON: None. TECHNIQUE: Axial CT imaging of the head was performed without intravenous contrast. One or more dose reduction techniques were used on this CT: automated exposure control, adjustment of the mAs and/or kVp according to patient size, and iterative reconstruction techniques. The specific techniques used on this CT exam have been documented in the patient's electronic medical record.  Digital Imaging and Communications in Medicine (DICOM) format image data are available to nonaffiliated external healthcare facilities or entities on a secure, media free, reciprocally searchable basis with patient authorization for at least a 12-month period after this study. _______________ FINDINGS: BRAIN AND POSTERIOR FOSSA: The sulci, folia, ventricles and basal cisterns are within normal limits for the patient's age. There is no intracranial hemorrhage, mass effect, or midline shift. There are no areas of abnormal parenchymal attenuation. EXTRA-AXIAL SPACES AND MENINGES: There are no abnormal extra-axial fluid collections. CALVARIUM: Intact. SINUSES: Clear. OTHER: None. _______________      No acute intracranial abnormalities.     CT CHEST ABD PELV WO CONT     Result Date: 5/25/2021  EXAM: CT of the chest, abdomen, and pelvis INDICATION: Hypoxia. Pain. COMPARISON: None. TECHNIQUE: Axial CT imaging of the chest, abdomen, and pelvis was performed without intravenous contrast. Multiplanar reformats were generated. Dose reduction techniques used: automated exposure control, adjustment of the mAs and/or kVp according to patient size, and iterative reconstruction techniques. Digital imaging and communications in Medicine (DICOM) format image data are available to nonaffiliated external healthcare facilities or entities on a secure, media free, reciprocally searchable basis with patient authorization for at least 12 months after the study. _______________ FINDINGS: CHEST: LUNGS: There are linear opacities at both lung bases. The lungs are otherwise clear. PLEURA: Normal, with no effusion or pneumothorax. AIRWAY: Normal. MEDIASTINUM: The heart is mildly enlarged. There is minimal pericardial fluid. Given the limitations of cardiac motion, great vessels are unremarkable. LYMPH NODES: No enlarged lymph nodes. =============== ABDOMEN/PELVIS: LIVER, BILIARY: Liver is normal. No biliary dilation. Gallstones are noted. PANCREAS: There appears to be minimal infiltrative change along the pancreas.  SPLEEN: Normal. ADRENALS: Normal. KIDNEYS: Normal. LYMPH NODES: No enlarged lymph nodes. GASTROINTESTINAL TRACT: No bowel dilation or wall thickening. PELVIC ORGANS: Unremarkable. VASCULATURE: Unremarkable. BONES: No acute or aggressive osseous abnormalities identified. OTHER: None. _______________      Linear opacities at the lung bases most consistent with atelectasis and/or scarring. Cholelithiasis. Minimal infiltrative change along the pancreas is a subtle finding. Please correlate with pancreatic enzymes.     US ABD LTD     Result Date: 5/24/2021  EXAM: Limited right upper quadrant abdominal ultrasound INDICATION: Elevated alkaline phosphatase and white count COMPARISON: None. TECHNIQUE: Real-time sonography in multiple planes of the [abdomen/right upper quadrant was performed with image documentation. Grayscale, color flow Doppler imaging, and velocity spectral waveform analysis of the portal vein was performed (duplex imaging). ] _______________ FINDINGS: LIVER: The liver [is echogenic suggesting hepatic steatosis, without focal mass.] Liver is enlarged in size, measuring 22.5 cm. Color flow Doppler and velocity spectral waveform analysis of the portal vein shows normal (hepatopedal) direction of flow. BILIARY SYSTEM: No intrahepatic biliary dilatation. Common bile duct is normal in caliber, measuring 4 mm. GALLBLADDER: There is sludge in the gallbladder along with two 4 mm calculi. There is no gallbladder wall thickening or pericholecystic fluid. [Negative sonographic Christine's sign per technologist's report.] RIGHT KIDNEY: Right kidney is normal in size, measuring 12.1 cm in length. No hydronephrosis or renal mass. No visible calculi. PANCREAS: Head and body are unremarkable in appearance though the tail is obscured by overlying bowel gas. IVC: Visualized portions are normal in caliber and patent. OTHER: No free intraperitoneal fluid. _______________      Sludge and gallstones in the gallbladder without evidence of acute cholecystitis.  No acute or focal abnormality otherwise to explain patient's right upper quadrant pain. Enlarged liver with hepatic steatosis     XR CHEST PORT     Result Date: 5/24/2021  EXAM:  AP Portable Chest X-ray 1 view INDICATION: Shortness of breath COMPARISON: None _______________ FINDINGS:  Heart and mediastinal contours are within normal limits for portable radiograph. Minimal bibasilar atelectasis or infiltrate seen. There is peribronchial thickening in the left lower lobe suggesting bronchitis. There are no pleural effusions. No acute osseous findings. ________________       Minimal bibasilar atelectasis or infiltrate with bronchitis.     ECHO ADULT COMPLETE     Result Date: 5/25/2021  · Left Ventricle: Normal cavity size and systolic function (ejection fraction normal). Mild concentric hypertrophy. The estimated EF is 55 - 60%. There is mild (grade 1) left ventricular diastolic dysfunction E'E= 11.11. Wall Scoring: The left ventricular wall motion is normal. · Pulmonary Artery: Pulmonary arteries not well visualized. Pulmonary arterial systolic pressure (PASP) is 30 mmHg. Pulmonary hypertension found to be mild. · IVC/Hepatic Veins: Mildly dilated inferior vena cava.  Moderately elevated central venous pressure (8 mmHg); IVC diameter is larger than 21 mm and collapses more than 50% with respiration.             No results found for this or any previous visit.         Please note that this dictation was completed with [employer], the computer voice recognition software.  Quite often unanticipated grammatical, syntax, homophones, and other interpretive errors are inadvertently transcribed by the computer software.  Please disregard these errors.  Please excuse any errors that have escaped final proofreading.      CC: Ninfa Weller MD

## 2021-05-30 LAB
BACTERIA SPEC CULT: NORMAL
BACTERIA SPEC CULT: NORMAL
SERVICE CMNT-IMP: NORMAL
SERVICE CMNT-IMP: NORMAL

## 2021-08-16 ENCOUNTER — HOSPITAL ENCOUNTER (OUTPATIENT)
Dept: LAB | Age: 41
Discharge: HOME | End: 2021-08-16

## 2021-08-16 ENCOUNTER — OFFICE VISIT (OUTPATIENT)
Dept: HEMATOLOGY | Age: 41
End: 2021-08-16
Payer: MEDICAID

## 2021-08-16 VITALS
OXYGEN SATURATION: 98 % | BODY MASS INDEX: 36.15 KG/M2 | WEIGHT: 217 LBS | HEART RATE: 90 BPM | SYSTOLIC BLOOD PRESSURE: 155 MMHG | HEIGHT: 65 IN | DIASTOLIC BLOOD PRESSURE: 109 MMHG | TEMPERATURE: 98.5 F

## 2021-08-16 DIAGNOSIS — R74.8 ELEVATED SERUM ALKALINE PHOSPHATASE LEVEL: Primary | ICD-10-CM

## 2021-08-16 PROBLEM — R09.02 HYPOXIA: Status: RESOLVED | Noted: 2021-05-24 | Resolved: 2021-08-16

## 2021-08-16 PROBLEM — E13.319 RETINOPATHY DUE TO SECONDARY DIABETES (HCC): Status: ACTIVE | Noted: 2021-08-16

## 2021-08-16 PROBLEM — D72.829 LEUKOCYTOSIS: Status: RESOLVED | Noted: 2021-05-24 | Resolved: 2021-08-16

## 2021-08-16 PROBLEM — E03.9 HYPOTHYROIDISM: Status: ACTIVE | Noted: 2021-08-16

## 2021-08-16 PROBLEM — Z91.14 NONCOMPLIANCE WITH MEDICATION REGIMEN: Status: RESOLVED | Noted: 2021-05-25 | Resolved: 2021-08-16

## 2021-08-16 PROBLEM — Z78.9 FAILURE OF OUTPATIENT TREATMENT: Status: RESOLVED | Noted: 2021-05-25 | Resolved: 2021-08-16

## 2021-08-16 PROBLEM — E11.9 TYPE II DIABETES MELLITUS (HCC): Status: ACTIVE | Noted: 2021-05-25

## 2021-08-16 PROBLEM — E11.42 DIABETIC POLYNEUROPATHY ASSOCIATED WITH TYPE 2 DIABETES MELLITUS (HCC): Status: ACTIVE | Noted: 2021-08-16

## 2021-08-16 PROBLEM — E87.6 HYPOKALEMIA: Status: RESOLVED | Noted: 2021-05-25 | Resolved: 2021-08-16

## 2021-08-16 LAB — XX-LABCORP SPECIMEN COL,LCBCF: NORMAL

## 2021-08-16 PROCEDURE — 99001 SPECIMEN HANDLING PT-LAB: CPT

## 2021-08-16 PROCEDURE — 99203 OFFICE O/P NEW LOW 30 MIN: CPT | Performed by: INTERNAL MEDICINE

## 2021-08-16 RX ORDER — TRAMADOL HYDROCHLORIDE 50 MG/1
50 TABLET ORAL
COMMUNITY
Start: 2021-08-04

## 2021-08-16 RX ORDER — LISINOPRIL 20 MG/1
TABLET ORAL
COMMUNITY
Start: 2021-06-24

## 2021-08-16 RX ORDER — LEVOTHYROXINE SODIUM 75 UG/1
150 TABLET ORAL DAILY
COMMUNITY
Start: 2021-05-04

## 2021-08-16 RX ORDER — METOPROLOL SUCCINATE 50 MG/1
100 TABLET, EXTENDED RELEASE ORAL DAILY
COMMUNITY
Start: 2021-06-04

## 2021-08-16 RX ORDER — HYDROCHLOROTHIAZIDE 25 MG/1
25 TABLET ORAL DAILY
COMMUNITY
Start: 2021-06-04 | End: 2022-04-23

## 2021-08-16 RX ORDER — ATORVASTATIN CALCIUM 20 MG/1
20 TABLET, FILM COATED ORAL
COMMUNITY
Start: 2021-05-20

## 2021-08-16 NOTE — Clinical Note
8/31/2021    Patient: Pedrito Grimm   YOB: 1980   Date of Visit: 8/16/2021     [phone]  Via Fax: [phone]    Dear Jackeline Egan MD,      Thank you for referring Ms. Olive Harris to [address] [place] [address],[address] for evaluation. My notes for this consultation are attached. If you have questions, please do not hesitate to call me. I look forward to following your patient along with you.       Sincerely,    Sola López MD

## 2021-08-16 NOTE — PROGRESS NOTES
[address] [facility]      Baltazar Artis MD, [place], Cite Gonzalo Pennington, Radha Rudd MD, MPH      Aditi Harman, PA-C    Jacky Hernandezpool, [facility]     Kaitlin Zamudio, [facility]   Jose M Marquez, [facility]-    Jon Quintanilla, [facility]       [facility]    at [address] [place], [address] Daisy Álvarez Bettie  22.    [phone]    FAX: [address] [place] [address] Drive, [address], [address]    [phone]    [address] [place] [address]: [phone]       Patient Care Team:  Angela Carrasco MD as PCP - General (Family Medicine)      Problem List  Date Reviewed: 8/16/2021        Codes Class Noted    Hypothyroidism ICD-10-CM: E03.9  ICD-9-CM: 244.9  8/16/2021        Retinopathy due to secondary diabetes ([facility] ICD-10-CM: E13.319  ICD-9-CM: 249.50, 362.01  8/16/2021        Type II diabetes mellitus ([facility] ICD-10-CM: E11.9  ICD-9-CM: 250.00  5/25/2021        Thrush ICD-10-CM: B37.0  ICD-9-CM: 112.0  5/25/2021        Morbid obesity ([facility] ICD-10-CM: E66.01  ICD-9-CM: 278.01  5/25/2021        Cholelithiasis ICD-10-CM: K80.20  ICD-9-CM: 574.20  5/25/2021        Hypertension ICD-10-CM: I10  ICD-9-CM: 401.9  5/25/2021        Severe dehydration ICD-10-CM: E86.0  ICD-9-CM: 276.51  5/25/2021              The clinicians listed above have asked me to see Jack Champagne in consultation regarding elevated liver enzymes and its management. All medical records sent by the referring physicians were reviewed including imaging studies     The patient is a 38 y.o.  female who was found to have elevated alkaline phosphatase in 2019. Serologic evaluation for markers of chronic liver disease was negative for HCV, HBV,     CT of the liver was performed in 5/2021. The results of the imaging suggested fatty liver disease. An assessment of liver fibrosis with biopsy, Fibroscan or elastography has not been performed. The patient has the following symptoms which could be attributed to the liver disorder:    fatigue,   pain in the right side over the liver,     The patient is not experiencing the following symptoms which are commonly seen in this liver disorder:   nausea,   vomiting,     The patient completes all daily activities without any functional limitations. ASSESSMENT AND PLAN:  Elevated liver enzymes  Persistent elevation in liver transaminases and alkaline phosphatase of unclear etiology at this time. Liver transaminases are elevated. ALP is elevated. Liver function is normal.  The platelet count is   normal.      Based upon laboratory studies and imaging the patient does not appear to have advanced liver disease. Serologic testing for causes of chronic liver disease was ordered. All was negative     The most likely causes for the liver chemistry abnormalities were discussed with the patient and include fatty liver disease, immune liver disorders,     The need to perform an assessment of liver fibrosis was discussed with the patient. The Fibroscan can assess liver fibrosis and determine if a patient has advanced fibrosis or cirrhosis without the need for liver biopsy. This will be performed at the next office visit. If the Fibroscan suggests advanced fibrosis then a liver biopsy should be considered. The Fibroscan can be repeated annually or as often as clinically indicated to assess for fibrosis progression and/or regression. Will perform laboratory testing to monitor liver function and degree of liver injury. This included BMP, hepatic panel, CBC with platelet count, INR. Will perform imaging of the liver with MRI and MRCP because of persistent elevation in ALP and possible bile duct disease.     Screening for Hepatocellular Carcinoma  HCC screening is not necessary if the patient has no evidence of cirrhosis. Treatment of other medical problems in patients with chronic liver disease  There are no contraindications for the patient to take most medications that are necessary for treatment of other medical issues. Counseling for alcohol in patients with chronic liver disease  The patient was counseled regarding alcohol consumption and the effect of alcohol on chronic liver disease. The patient does not consume any significant amount of alcohol. Vaccinations   Vaccination for viral hepatitis A is recommended since the patient has no serologic evidence of previous exposure or vaccination with immunity. HAV and HBV is not needed. Routine vaccinations against other bacterial and viral agents can be performed as indicated. Annual flu vaccination should be administered if indicated. ALLERGIES  Allergies   Allergen Reactions    Codeine Itching    Gabapentin Other (comments)     \"Makes my skin crawl\"    Lyrica [Pregabalin] Other (comments)     \"Makes my skin crawl\"       MEDICATIONS  Current Outpatient Medications   Medication Sig    atorvastatin (LIPITOR) 20 mg tablet Take 20 mg by mouth nightly.  hydroCHLOROthiazide (HYDRODIURIL) 25 mg tablet Take 25 mg by mouth daily.  levothyroxine (SYNTHROID) 75 mcg tablet Take 75 mcg by mouth daily.  lisinopriL (PRINIVIL, ZESTRIL) 20 mg tablet TAKE 1 TABLET BY MOUTH ONCE DAILY    metoprolol succinate (TOPROL-XL) 50 mg XL tablet Take 100 mg by mouth daily.  traMADoL (ULTRAM) 50 mg tablet Take 50 mg by mouth two (2) times a day.  insulin glargine (Lantus Solostar U-100 Insulin) 100 unit/mL (3 mL) inpn 25 Units by SubCUTAneous route nightly.     insulin lispro (HUMALOG) 100 unit/mL kwikpen Check blood sugars before meals  For Blood Sugar (mg/dL) of:     Less than 150 =   0 units           150 -199 =   2 units  200 -249 =   4 units  250 -299 =   6 units  300 -349 =   8 units  350 and above =   10 units  Call MD    Blood-Glucose Meter monitoring kit Check blood sugars ACHS    glucose blood VI test strips (blood glucose test) strip Check blood sugars ACHS    lancets misc Check blood sugars ACHS     No current facility-administered medications for this visit. SYSTEM REVIEW NOT RELATED TO LIVER DISEASE OR REVIEWED ABOVE:  Constitution systems: Negative for fever, chills, weight gain, weight loss. Eyes: Negative for visual changes. ENT: Negative for sore throat, painful swallowing. Respiratory: Negative for cough, hemoptysis, SOB. Cardiology: Negative for chest pain, palpitations. GI:  Negative for constipation or diarrhea. : Negative for urinary frequency, dysuria, hematuria, nocturia. Skin: Negative for rash. Hematology: Negative for easy bruising, blood clots. Musculo-skelatal: Negative for back pain, muscle pain, weakness. Neurologic: Negative for headaches, dizziness, vertigo, memory problems not related to HE. Psychology: Negative for anxiety, depression. FAMILY HISTORY:  The father Has/had the following following chronic disease(s): None. The mother Has/had the following chronic disease(s): None. There is no family history of liver disease. SOCIAL HISTORY:  The patient has never been . The patient has 1 child and 2 adopted children   The patient has never used tobacco products. The patient has never consumed significant amounts of alcohol. The patient currently works full time as in home . PHYSICAL EXAMINATION:  Visit Vitals  BP (!) 155/109 (BP 1 Location: Left upper arm, BP Patient Position: Sitting)   Pulse 90   Temp 98.5 °F (36.9 °C) (Tympanic)   Ht 5' 5\" (1.651 m)   Wt 217 lb (98.4 kg)   SpO2 98%   BMI 36.11 kg/m²     General: No acute distress. Eyes: Sclera anicteric. ENT: No oral lesions. Thyroid normal.  Nodes: No adenopathy. Skin: No spider angiomata. No jaundice. No palmar erythema. Respiratory: Lungs clear to auscultation. Cardiovascular: Regular heart rate. No murmurs. No JVD. Abdomen: Soft non-tender. Liver size normal to percussion/palpation. Spleen not palpable. No obvious ascites. Extremities: No edema. No muscle wasting. No gross arthritic changes. Neurologic: Alert and oriented. Cranial nerves grossly intact. No asterixis. LABORATORY STUDIES:  Liver [place] of [address] Units 8/16/2021   WBC 3.4 - 10.8 x10E3/uL 11.6 (H)   ANC 1.4 - 7.0 x10E3/uL 6.9   HGB 11.1 - 15.9 g/dL 12.9    - 450 x10E3/uL 303   INR 0.9 - 1.2 0.9   AST 0 - 40 IU/L 43 (H)   ALT 0 - 32 IU/L 41 (H)   Alk Phos 48 - 121 IU/L 430 (H)   Bili, Total 0.0 - 1.2 mg/dL 0.3   Bili, Direct 0.00 - 0.40 mg/dL 0.13   Albumin 3.8 - 4.8 g/dL 4.2   BUN 6 - 24 mg/dL 28 (H)   Creat 0.57 - 1.00 mg/dL 1.29 (H)   Na 134 - 144 mmol/L 138   K 3.5 - 5.2 mmol/L 5.3 (H)   Cl 96 - 106 mmol/L 101   CO2 20 - 29 mmol/L 24   Glucose 65 - 99 mg/dL 213 (H)     SEROLOGIES:  6/2021. HBsurface antigen negative, anti-HBsurface negative, anti-HCV negative    Serologies Latest Ref Rng & Units 8/16/2021   Hep A Ab, Total Negative Negative   TONE, IFA  Negative   C-ANCA Neg:<1:20 titer <1:20   P-ANCA Neg:<1:20 titer <1:20   ANCA Neg:<1:20 titer <1:20   ASMCA 0 - 19 Units 16   M2 Ab 0.0 - 20.0 Units <20.0   Ceruloplasmin 19.0 - 39.0 mg/dL 32.2   Alpha-1 antitrypsin level 101 - 187 mg/dL 159     LIVER HISTOLOGY:  Not available or performed    ENDOSCOPIC PROCEDURES:  Not available or performed    RADIOLOGY:  5/2021. CT scan abdomen without IV contrast.  Changes consistent with fatty liver. No liver mass lesions. Normal spleen. No ascites. OTHER TESTING:  Not available or performed    FOLLOW-UP:  All of the issues listed above in the Assessment and Plan were discussed with the patient. All questions were answered. The patient expressed a clear understanding of the above.     [address] [address] in 4 weeks for for Fibroscan to review all data and determine the treatment plan.       Sumi San MD  [address]  4 [facility], [address] [facility]  [address] Shelia Evans, [address]  12 [facility]

## 2021-08-19 LAB
A1AT SERPL-MCNC: 159 MG/DL (ref 101–187)
ACTIN IGG SERPL-ACNC: 16 UNITS (ref 0–19)
ALBUMIN SERPL-MCNC: 4.2 G/DL (ref 3.8–4.8)
ALP SERPL-CCNC: 430 IU/L (ref 48–121)
ALT SERPL-CCNC: 41 IU/L (ref 0–32)
ANA TITR SER IF: NEGATIVE {TITER}
AST SERPL-CCNC: 43 IU/L (ref 0–40)
BASOPHILS # BLD AUTO: 0.1 X10E3/UL (ref 0–0.2)
BASOPHILS NFR BLD AUTO: 1 %
BILIRUB DIRECT SERPL-MCNC: 0.13 MG/DL (ref 0–0.4)
BILIRUB SERPL-MCNC: 0.3 MG/DL (ref 0–1.2)
BUN SERPL-MCNC: 28 MG/DL (ref 6–24)
BUN/CREAT SERPL: 22 (ref 9–23)
C-ANCA TITR SER IF: NORMAL TITER
CALCIUM SERPL-MCNC: 9.7 MG/DL (ref 8.7–10.2)
CERULOPLASMIN SERPL-MCNC: 32.2 MG/DL (ref 19–39)
CHLORIDE SERPL-SCNC: 101 MMOL/L (ref 96–106)
CO2 SERPL-SCNC: 24 MMOL/L (ref 20–29)
CREAT SERPL-MCNC: 1.29 MG/DL (ref 0.57–1)
EOSINOPHIL # BLD AUTO: 0.6 X10E3/UL (ref 0–0.4)
EOSINOPHIL NFR BLD AUTO: 6 %
ERYTHROCYTE [DISTWIDTH] IN BLOOD BY AUTOMATED COUNT: 13.2 % (ref 11.7–15.4)
GLUCOSE SERPL-MCNC: 213 MG/DL (ref 65–99)
HAV AB SER QL IA: NEGATIVE
HCT VFR BLD AUTO: 38.6 % (ref 34–46.6)
HFE GENE MUT ANL BLD/T: NORMAL
HGB BLD-MCNC: 12.9 G/DL (ref 11.1–15.9)
IMM GRANULOCYTES # BLD AUTO: 0 X10E3/UL (ref 0–0.1)
IMM GRANULOCYTES NFR BLD AUTO: 0 %
INR PPP: 0.9 (ref 0.9–1.2)
LYMPHOCYTES # BLD AUTO: 3.4 X10E3/UL (ref 0.7–3.1)
LYMPHOCYTES NFR BLD AUTO: 29 %
MCH RBC QN AUTO: 29.5 PG (ref 26.6–33)
MCHC RBC AUTO-ENTMCNC: 33.4 G/DL (ref 31.5–35.7)
MCV RBC AUTO: 88 FL (ref 79–97)
MITOCHONDRIA M2 IGG SER-ACNC: <20 UNITS (ref 0–20)
MONOCYTES # BLD AUTO: 0.6 X10E3/UL (ref 0.1–0.9)
MONOCYTES NFR BLD AUTO: 5 %
NEUTROPHILS # BLD AUTO: 6.9 X10E3/UL (ref 1.4–7)
NEUTROPHILS NFR BLD AUTO: 59 %
P-ANCA ATYPICAL TITR SER IF: NORMAL TITER
P-ANCA TITR SER IF: NORMAL TITER
PLATELET # BLD AUTO: 303 X10E3/UL (ref 150–450)
POTASSIUM SERPL-SCNC: 5.3 MMOL/L (ref 3.5–5.2)
PROT SERPL-MCNC: 7.5 G/DL (ref 6–8.5)
PROTHROMBIN TIME: 9.9 SEC (ref 9.1–12)
RBC # BLD AUTO: 4.37 X10E6/UL (ref 3.77–5.28)
SODIUM SERPL-SCNC: 138 MMOL/L (ref 134–144)
WBC # BLD AUTO: 11.6 X10E3/UL (ref 3.4–10.8)

## 2021-08-23 ENCOUNTER — HOSPITAL ENCOUNTER (OUTPATIENT)
Dept: MRI IMAGING | Age: 41
Discharge: HOME | End: 2021-08-23
Attending: INTERNAL MEDICINE
Payer: MEDICAID

## 2021-08-23 VITALS — WEIGHT: 205 LBS | BODY MASS INDEX: 34.11 KG/M2

## 2021-08-23 DIAGNOSIS — R74.8 ELEVATED SERUM ALKALINE PHOSPHATASE LEVEL: ICD-10-CM

## 2021-08-23 PROCEDURE — 74011250636 HC RX REV CODE- 250/636: Performed by: INTERNAL MEDICINE

## 2021-08-23 PROCEDURE — A9577 INJ MULTIHANCE: HCPCS | Performed by: INTERNAL MEDICINE

## 2021-08-23 PROCEDURE — 74183 MRI ABD W/O CNTR FLWD CNTR: CPT

## 2021-08-23 RX ADMIN — GADOBENATE DIMEGLUMINE 20 ML: 529 INJECTION, SOLUTION INTRAVENOUS at 14:03

## 2021-11-10 ENCOUNTER — OFFICE VISIT (OUTPATIENT)
Dept: HEMATOLOGY | Age: 41
End: 2021-11-10
Payer: MEDICAID

## 2021-11-10 VITALS
BODY MASS INDEX: 35.75 KG/M2 | SYSTOLIC BLOOD PRESSURE: 177 MMHG | TEMPERATURE: 97.8 F | HEART RATE: 78 BPM | HEIGHT: 65 IN | WEIGHT: 214.6 LBS | OXYGEN SATURATION: 98 % | RESPIRATION RATE: 18 BRPM | DIASTOLIC BLOOD PRESSURE: 120 MMHG

## 2021-11-10 DIAGNOSIS — K76.0 NAFLD (NONALCOHOLIC FATTY LIVER DISEASE): Primary | ICD-10-CM

## 2021-11-10 PROCEDURE — 99214 OFFICE O/P EST MOD 30 MIN: CPT | Performed by: INTERNAL MEDICINE

## 2021-11-10 PROCEDURE — 91200 LIVER ELASTOGRAPHY: CPT | Performed by: INTERNAL MEDICINE

## 2021-11-10 NOTE — PROGRESS NOTES
[address] [facility]      Bre Pruitt MD, [address], Cite Deepika Gorge, Liam Leos MD, MPH      Kristy Warren, PA-C    Cristine Dugan, [facility]-BC     April S Robb, [facility]   Rachelle Lacey, FNP-C    Dorys Bowens, [facility]       Lenore [address]    at [place] [address] [address] [facility] [place], [address] Daisy Álvarez Bettie  22.    [phone]    FAX: [address]    at [address], [address], [address]    [phone]    [facility] Street: [phone]       Patient Care Team:  Danelle Padgett MD as PCP - General (Family Medicine)      Problem List  Date Reviewed: 8/31/2021          Codes Class Noted    Hypothyroidism ICD-10-CM: E03.9  ICD-9-CM: 244.9  8/16/2021        Retinopathy due to secondary diabetes [facility]) ICD-10-CM: E13.319  ICD-9-CM: 249.50, 362.01  8/16/2021        Diabetic polyneuropathy associated with type 2 diabetes mellitus ([facility] [address] ICD-10-CM: E11.42  ICD-9-CM: 250.60, 357.2  8/16/2021        Type II diabetes mellitus ([facility] [address] [address] ICD-10-CM: E11.9  ICD-9-CM: 250.00  5/25/2021        Thrush ICD-10-CM: B37.0  ICD-9-CM: 112.0  5/25/2021        Morbid obesity ([facility] ICD-10-CM: E66.01  ICD-9-CM: 278.01  5/25/2021        Cholelithiasis ICD-10-CM: K80.20  ICD-9-CM: 574.20  5/25/2021        Hypertension ICD-10-CM: I10  ICD-9-CM: 401.9  5/25/2021        Severe dehydration ICD-10-CM: E86.0  ICD-9-CM: 276.51  5/25/2021                Rosa Quick is being seen at [address] for management of elevated liver enzymes. The active problem list, all pertinent past medical history, medications, radiologic findings and laboratory findings related to the liver disorder were reviewed and discussed with the patient. The patient is a 42 y.o.   female who was found to have elevated alkaline phosphatase in 2019. Serologic evaluation for markers of chronic liver disease was negative     The most recent imaging of the liver was MRI performed in 8/2021. Results suggest no evidence of bile duct disease. Assessment of liver fibrosis with Fibroscan was performed in the office today. The result was 8.5 kPa which correlates with stage 2 septal fibrosis. The CAP score of 388 suggests hepatic steatosis. The patient has the following symptoms which could be attributed to the liver disorder:    fatigue,   pain in the right side over the liver,     The patient is not experiencing the following symptoms which are commonly seen in this liver disorder:   nausea,   vomiting,     The patient completes all daily activities without any functional limitations. ASSESSMENT AND PLAN:  Elevated liver enzymes  Persistent elevation in liver transaminases and alkaline phosphatase of unclear etiology at this time. Liver transaminases are elevated. ALP is elevated. Liver function is normal.  The platelet count is normal.      Serologic testing for causes of chronic liver disease was ordered. All was negative     Fibroscan in 11/2021 demonstrated  8.5 kPa and  suggesting fatty liver and stage 2 septal fibrosis    Based upon laboratory studies, imaging, Fibroscan the patient may have significant liver injury. The most likely causes for the liver chemistry abnormalities were discussed with the patient and include fatty liver disease, immune liver disorders,     The need to perform a liver biopsy to help determine the cause and severity of the liver test abnormalities was discussed. The risks of performing the liver biopsy including pain, puncture of the lung, gallbladder, intestine or kidney and bleeding were discussed. The patient has decided to have a liver biopsy. This will be scheduled.     Screening for Hepatocellular Carcinoma  HCC screening is not necessary if the patient has no evidence of cirrhosis. Treatment of other medical problems in patients with chronic liver disease  There are no contraindications for the patient to take most medications that are necessary for treatment of other medical issues. Counseling for alcohol in patients with chronic liver disease  The patient was counseled regarding alcohol consumption and the effect of alcohol on chronic liver disease. The patient does not consume any significant amount of alcohol. Vaccinations   Vaccination for viral hepatitis A is recommended since the patient has no serologic evidence of previous exposure or vaccination with immunity. HAV and HBV is not needed. Routine vaccinations against other bacterial and viral agents can be performed as indicated. Annual flu vaccination should be administered if indicated. ALLERGIES  Allergies   Allergen Reactions    Codeine Itching    Gabapentin Other (comments)     \"Makes my skin crawl\"    Lyrica [Pregabalin] Other (comments)     \"Makes my skin crawl\"       MEDICATIONS  Current Outpatient Medications   Medication Sig    atorvastatin (LIPITOR) 20 mg tablet Take 20 mg by mouth nightly.  hydroCHLOROthiazide (HYDRODIURIL) 25 mg tablet Take 25 mg by mouth daily.  levothyroxine (SYNTHROID) 75 mcg tablet Take 75 mcg by mouth daily.  lisinopriL (PRINIVIL, ZESTRIL) 20 mg tablet TAKE 1 TABLET BY MOUTH ONCE DAILY    metoprolol succinate (TOPROL-XL) 50 mg XL tablet Take 100 mg by mouth daily.  traMADoL (ULTRAM) 50 mg tablet Take 50 mg by mouth two (2) times a day.  insulin glargine (Lantus Solostar U-100 Insulin) 100 unit/mL (3 mL) inpn 25 Units by SubCUTAneous route nightly.     insulin lispro (HUMALOG) 100 unit/mL kwikpen Check blood sugars before meals  For Blood Sugar (mg/dL) of:     Less than 150 =   0 units           150 -199 =   2 units  200 -249 =   4 units  250 -299 =   6 units  300 -349 =   8 units  350 and above =   10 units  Call MD    Blood-Glucose Meter monitoring kit Check blood sugars ACHS    glucose blood VI test strips (blood glucose test) strip Check blood sugars ACHS    lancets misc Check blood sugars ACHS     No current facility-administered medications for this visit. SYSTEM REVIEW NOT RELATED TO LIVER DISEASE OR REVIEWED ABOVE:  Constitution systems: Negative for fever, chills, weight gain, weight loss. Eyes: Negative for visual changes. ENT: Negative for sore throat, painful swallowing. Respiratory: Negative for cough, hemoptysis, SOB. Cardiology: Negative for chest pain, palpitations. GI:  Negative for constipation or diarrhea. : Negative for urinary frequency, dysuria, hematuria, nocturia. Skin: Negative for rash. Hematology: Negative for easy bruising, blood clots. Musculo-skelatal: Negative for back pain, muscle pain, weakness. Neurologic: Negative for headaches, dizziness, vertigo, memory problems not related to HE. Psychology: Negative for anxiety, depression. FAMILY HISTORY:  The father Has/had the following following chronic disease(s): None. The mother Has/had the following chronic disease(s): None. There is no family history of liver disease. SOCIAL HISTORY:  The patient has never been . The patient has 1 child and 2 adopted children   The patient has never used tobacco products. The patient has never consumed significant amounts of alcohol. The patient currently works full time as in home . PHYSICAL EXAMINATION:  Visit Vitals  BP (!) 177/120   Pulse 78   Temp 97.8 °F (36.6 °C)   Resp 18   Ht 5' 5\" (1.651 m)   Wt 214 lb 9.6 oz (97.3 kg)   SpO2 98%   BMI 35.71 kg/m²     General: No acute distress. Eyes: Sclera anicteric. ENT: No oral lesions. Thyroid normal.  Nodes: No adenopathy. Skin: No spider angiomata. No jaundice. No palmar erythema. Respiratory: Lungs clear to auscultation. Cardiovascular: Regular heart rate. No murmurs. No JVD.   Abdomen: Soft non-tender. Liver size normal to percussion/palpation. Spleen not palpable. No obvious ascites. Extremities: No edema. No muscle wasting. No gross arthritic changes. Neurologic: Alert and oriented. Cranial nerves grossly intact. No asterixis. LABORATORY STUDIES:  Liver [place] of [address] Units 8/16/2021   WBC 3.4 - 10.8 x10E3/uL 11.6 (H)   ANC 1.4 - 7.0 x10E3/uL 6.9   HGB 11.1 - 15.9 g/dL 12.9    - 450 x10E3/uL 303   INR 0.9 - 1.2 0.9   AST 0 - 40 IU/L 43 (H)   ALT 0 - 32 IU/L 41 (H)   Alk Phos 48 - 121 IU/L 430 (H)   Bili, Total 0.0 - 1.2 mg/dL 0.3   Bili, Direct 0.00 - 0.40 mg/dL 0.13   Albumin 3.8 - 4.8 g/dL 4.2   BUN 6 - 24 mg/dL 28 (H)   Creat 0.57 - 1.00 mg/dL 1.29 (H)   Na 134 - 144 mmol/L 138   K 3.5 - 5.2 mmol/L 5.3 (H)   Cl 96 - 106 mmol/L 101   CO2 20 - 29 mmol/L 24   Glucose 65 - 99 mg/dL 213 (H)     SEROLOGIES:  6/2021. HBsurface antigen negative, anti-HBsurface negative, anti-HCV negative    Serologies Latest Ref Rng & Units 8/16/2021   Hep A Ab, Total Negative Negative   TONE, IFA  Negative   C-ANCA Neg:<1:20 titer <1:20   P-ANCA Neg:<1:20 titer <1:20   ANCA Neg:<1:20 titer <1:20   ASMCA 0 - 19 Units 16   M2 Ab 0.0 - 20.0 Units <20.0   Ceruloplasmin 19.0 - 39.0 mg/dL 32.2   Alpha-1 antitrypsin level 101 - 187 mg/dL 159     LIVER HISTOLOGY:  11/2021. [facility]. EkPa was 8.5. IQR/med 25%. . The results suggested a fibrosis level of F2. The CAP score suggests there is hepatic steatosis. ENDOSCOPIC PROCEDURES:  Not available or performed    RADIOLOGY:  5/2021. CT scan abdomen without IV contrast.  Changes consistent with fatty liver. No liver mass lesions. Normal spleen. No ascites. 2021/8. Dynamic MRI of liver. Normal appearing liver. No liver mass lesions. Normal spleen. No dilated bile ducts. No bile duct strictures. No ascites.     OTHER TESTING:  Not available or performed    FOLLOW-UP:  All of the issues listed above in the Assessment and Plan were discussed with the patient. All questions were answered. The patient expressed a clear understanding of the above. [facility] in 2 weeks after liver biopsy.       Courtney Dumont MD  [address] [facility]  4 [facility], [address] [place] [address] [place], [address]  [address] [place] [address]

## 2021-11-10 NOTE — Clinical Note
11/26/2021    Patient: Ronald Bergman   YOB: 1980   Date of Visit: 11/10/2021     Izabel Kim MD  [facility] [address]  Via Fax: [phone]    Dear Izabel Kim MD,      Thank you for referring Ms. Kaelyn Lundy to [address] [place] [address],[address] for evaluation. My notes for this consultation are attached. If you have questions, please do not hesitate to call me. I look forward to following your patient along with you.       Sincerely,    Tamera Johns MD

## 2022-01-10 ENCOUNTER — TELEPHONE (OUTPATIENT)
Dept: HEMATOLOGY | Age: 42
End: 2022-01-10

## 2022-01-10 NOTE — TELEPHONE ENCOUNTER
Patient called to say she has been tested for COVID a week ago, states she tested negative,states 'still congested feels like an upper respiratory infection', and could the liver biopsy still be done Wed Jan 12th at 7 am. Advised patient to get another COVID test today or tomorrow and if she feels better and test negative again the biopsy can be done.

## 2022-01-12 ENCOUNTER — HOSPITAL ENCOUNTER (OUTPATIENT)
Dept: ULTRASOUND IMAGING | Age: 42
Discharge: HOME | End: 2022-01-12
Attending: INTERNAL MEDICINE

## 2022-01-12 DIAGNOSIS — R79.89 ELEVATED LFTS: ICD-10-CM

## 2022-03-09 DIAGNOSIS — Z01.812 PRE-PROCEDURE LAB EXAM: Primary | ICD-10-CM

## 2022-03-10 ENCOUNTER — HOSPITAL ENCOUNTER (OUTPATIENT)
Dept: PREADMISSION TESTING | Age: 42
Discharge: HOME | End: 2022-03-10
Payer: MEDICAID

## 2022-03-10 PROCEDURE — U0003 INFECTIOUS AGENT DETECTION BY NUCLEIC ACID (DNA OR RNA); SEVERE ACUTE RESPIRATORY SYNDROME CORONAVIRUS 2 (SARS-COV-2) (CORONAVIRUS DISEASE [COVID-19]), AMPLIFIED PROBE TECHNIQUE, MAKING USE OF HIGH THROUGHPUT TECHNOLOGIES AS DESCRIBED BY CMS-2020-01-R: HCPCS

## 2022-03-11 LAB — SARS-COV-2, NAA: NOT DETECTED

## 2022-03-16 ENCOUNTER — HOSPITAL ENCOUNTER (OUTPATIENT)
Dept: ULTRASOUND IMAGING | Age: 42
Discharge: HOME | End: 2022-03-16
Attending: INTERNAL MEDICINE
Payer: MEDICAID

## 2022-03-16 ENCOUNTER — HOSPITAL ENCOUNTER (OUTPATIENT)
Age: 42
Setting detail: OUTPATIENT SURGERY
Discharge: HOME | End: 2022-03-16
Attending: INTERNAL MEDICINE | Admitting: INTERNAL MEDICINE
Payer: MEDICAID

## 2022-03-16 VITALS
RESPIRATION RATE: 16 BRPM | WEIGHT: 229.9 LBS | TEMPERATURE: 97.2 F | DIASTOLIC BLOOD PRESSURE: 88 MMHG | HEIGHT: 65 IN | OXYGEN SATURATION: 98 % | SYSTOLIC BLOOD PRESSURE: 132 MMHG | HEART RATE: 78 BPM | BODY MASS INDEX: 38.3 KG/M2

## 2022-03-16 DIAGNOSIS — K76.0 NAFLD (NONALCOHOLIC FATTY LIVER DISEASE): ICD-10-CM

## 2022-03-16 LAB
GLUCOSE BLD STRIP.AUTO-MCNC: 165 MG/DL (ref 70–110)
HCG UR QL: NEGATIVE

## 2022-03-16 PROCEDURE — 47000 NEEDLE BIOPSY OF LIVER PERQ: CPT | Performed by: INTERNAL MEDICINE

## 2022-03-16 PROCEDURE — 82962 GLUCOSE BLOOD TEST: CPT

## 2022-03-16 PROCEDURE — 74011250636 HC RX REV CODE- 250/636: Performed by: INTERNAL MEDICINE

## 2022-03-16 PROCEDURE — 76705 ECHO EXAM OF ABDOMEN: CPT

## 2022-03-16 PROCEDURE — 2709999900 HC NON-CHARGEABLE SUPPLY: Performed by: INTERNAL MEDICINE

## 2022-03-16 PROCEDURE — 88313 SPECIAL STAINS GROUP 2: CPT

## 2022-03-16 PROCEDURE — 76040000019: Performed by: INTERNAL MEDICINE

## 2022-03-16 PROCEDURE — 77030013826 HC NDL BIOP MAXCOR BARD -B: Performed by: INTERNAL MEDICINE

## 2022-03-16 PROCEDURE — 76942 ECHO GUIDE FOR BIOPSY: CPT | Performed by: INTERNAL MEDICINE

## 2022-03-16 PROCEDURE — 74011000250 HC RX REV CODE- 250: Performed by: INTERNAL MEDICINE

## 2022-03-16 PROCEDURE — 88307 TISSUE EXAM BY PATHOLOGIST: CPT

## 2022-03-16 PROCEDURE — 81025 URINE PREGNANCY TEST: CPT

## 2022-03-16 PROCEDURE — 77030003666 HC NDL SPINAL BD -A: Performed by: INTERNAL MEDICINE

## 2022-03-16 RX ORDER — SODIUM CHLORIDE 0.9 % (FLUSH) 0.9 %
5-40 SYRINGE (ML) INJECTION EVERY 8 HOURS
Status: CANCELLED | OUTPATIENT
Start: 2022-03-16

## 2022-03-16 RX ORDER — ONDANSETRON 2 MG/ML
4 INJECTION INTRAMUSCULAR; INTRAVENOUS
Status: DISCONTINUED | OUTPATIENT
Start: 2022-03-16 | End: 2022-03-16 | Stop reason: HOSPADM

## 2022-03-16 RX ORDER — SODIUM CHLORIDE 9 MG/ML
125 INJECTION, SOLUTION INTRAVENOUS CONTINUOUS
Status: DISCONTINUED | OUTPATIENT
Start: 2022-03-16 | End: 2022-03-16 | Stop reason: HOSPADM

## 2022-03-16 RX ORDER — SODIUM CHLORIDE 0.9 % (FLUSH) 0.9 %
5-40 SYRINGE (ML) INJECTION AS NEEDED
Status: CANCELLED | OUTPATIENT
Start: 2022-03-16

## 2022-03-16 RX ORDER — FENTANYL CITRATE 50 UG/ML
25 INJECTION, SOLUTION INTRAMUSCULAR; INTRAVENOUS
Status: DISCONTINUED | OUTPATIENT
Start: 2022-03-16 | End: 2022-03-16 | Stop reason: HOSPADM

## 2022-03-16 RX ORDER — LIDOCAINE HYDROCHLORIDE 10 MG/ML
10 INJECTION INFILTRATION; PERINEURAL ONCE
Status: COMPLETED | OUTPATIENT
Start: 2022-03-16 | End: 2022-03-16

## 2022-03-16 RX ADMIN — ONDANSETRON 4 MG: 2 INJECTION INTRAMUSCULAR; INTRAVENOUS at 09:19

## 2022-03-16 RX ADMIN — FENTANYL CITRATE 25 MCG: 50 INJECTION, SOLUTION INTRAMUSCULAR; INTRAVENOUS at 09:43

## 2022-03-16 RX ADMIN — FENTANYL CITRATE 25 MCG: 50 INJECTION, SOLUTION INTRAMUSCULAR; INTRAVENOUS at 09:17

## 2022-03-16 RX ADMIN — SODIUM CHLORIDE 125 ML/HR: 9 INJECTION, SOLUTION INTRAVENOUS at 08:06

## 2022-03-16 NOTE — PROCEDURES
[address] [facility], MD, [place]abb, [facility] [place], [place]      BRINA Bolden, [facility]     April S Robb, [facility]   BANDAR KwokP-DENISE Guilloryone [facility]       [facility] 136    at [address], [address] Bettie Hernandez  22.    [phone]    FAX: [address] [place] [address], [address]    [phone]    FAX: [phone]         LIVER BIOPSY PROCEDURE NOTE    Kathy Oakes  1980    INDICATIONS/PRE-OPERATIVE  DIAGNOSIS:  NAFLD    : Angela Patton MD    SURGICAL ASSISTANT:  None    PROSTHETIC DEVICES, TISSUE GRAFTS, TRANSPLANTED ORGANS:  Not applicable    SEDATION: 1% Lidocaine injection 15 ml    PROCEDURE:  Informed consent to perform the procedure was obtained from the patient. The patient was positioned on the edge of the stretcher lying flat in the supine position. Ultrasound was utilized to image the liver. The diaphragm and any major mass lesion or vascular structures within the liver were identified. An appropriate site for liver biopsy was identified. The distance from the surface of the skin to the liver capsule was 5 cm. This area was prepped with betadine and draped in sterile fashion. The skin was infiltrated with 1% lidocaine. The deeper subcutanous tissues and liver capsule overlying the biopsy site were then infiltrated with 1% lidocaine until appropriate anesthesia was obtained. A small incision was made in the skin so the biopsy devise could be easily inserted. A total of 2 passes with the 16 gauge Bard biopsy devise was then made into the liver. Core(s) of liver tissue totaling 4 cm in length were obtained and placed into tissue fixative.   A band aid was placed over the biopsy site. The patient was then repositioned on the right side and transported to the recovery area on the stretcher for routine monitoring until discharge. The specimen was sent to pathology for processing via the normal transport mechanism. SPECIMEN COLLECTED: Liver    INTERVENTIONS:  None    ESTIMATED BLOOD LOSS: Negligible.      COMPLICATIONS:  None    POST-OPERATIVE DIAGNOSIS: Same as Pre-operative Diagnosis      Aurelia Oconnor MD  [address]  [address] [place] [address] 58, [address]  [address] [place] [address]

## 2022-03-16 NOTE — DISCHARGE INSTRUCTIONS
DISCHARGE SUMMARY from Nurse    PATIENT INSTRUCTIONS:    After general anesthesia or intravenous sedation, for 24 hours or while taking prescription Narcotics:  · Limit your activities  · Do not drive and operate hazardous machinery  · Do not make important personal or business decisions  · Do  not drink alcoholic beverages  · If you have not urinated within 8 hours after discharge, please contact your surgeon on call. Report the following to your surgeon:  · Excessive pain, swelling, redness or odor of or around the surgical area  · Temperature over 100.5  · Nausea and vomiting lasting longer than 4 hours or if unable to take medications  · Any signs of decreased circulation or nerve impairment to extremity: change in color, persistent  numbness, tingling, coldness or increase pain  · Any questions    What to do at Home:  Recommended activity: as above   If you experience any of the following symptoms as above , please follow up with [facility]. *  Please give a list of your current medications to your Primary Care Provider. *  Please update this list whenever your medications are discontinued, doses are      changed, or new medications (including over-the-counter products) are added. *  Please carry medication information at all times in case of emergency situations. These are general instructions for a healthy lifestyle:    No smoking/ No tobacco products/ Avoid exposure to second hand smoke  Surgeon General's Warning:  Quitting smoking now greatly reduces serious risk to your health.     Obesity, smoking, and sedentary lifestyle greatly increases your risk for illness    A healthy diet, regular physical exercise & weight monitoring are important for maintaining a healthy lifestyle    You may be retaining fluid if you have a history of heart failure or if you experience any of the following symptoms:  Weight gain of 3 pounds or more overnight or 5 pounds in a week, increased swelling in our hands or feet or shortness of breath while lying flat in bed. Please call your doctor as soon as you notice any of these symptoms; do not wait until your next office visit. The discharge information has been reviewed with the patient and caregiver. The patient and caregiver verbalized understanding. Discharge medications reviewed with the patient and caregiver and appropriate educational materials and side effects teaching were provided. ___________________________________________________________________________________________________________________________________[address], MD, FACP, [facility] [place], [place]      BRINA Jiménez, ACNP-BC     April S Robb, AGPCNP-BC   See Mendenhall, FNP-C    Evelyn Laureano, [facility]-BC       Lenore [facility] 136    at [address], [address] Bettie Hernandez  22.    [phone]    FAX: [address] [place] [address], [address] [place] [address]    [phone]    FAX: [phone]         LIVER BIOPSY DISCHARGE INSTRUCTIONS      Seun Kennedy  1980  Date: 3/16/2022    DIET:    Tyler Harris may resume your previous diet. ACTIVITIES:  Rest quietly the rest of today. You should not lift any objects more than 20 pounds for the next 2 days. If you work sitting down without strenuous activity you may return to work tomorrow. If you exert yourself or do heavy lifting at work you should take tomorrow off. Do not drive or operate hazardous machinery for 12 hours after you are discharged from this procedure. SPECIAL INSTRUCTIONS:  Do not use any aspirin or non-steroidal (Motin, Advil, Naproxen, etc) pain medications for the next 2 days.   You may use extra-strength Tylenol (acetaminophen) if you experience pain or discomfort later today. Restarting blood thinners: If you were taking blood thinners prior to the procedure you can restart these in 2 days. Call the [facility] office if you experience any of the following:  Persistent or severe abdominal pain. Persistent or severe abdominal distention. Fever and chills   Nausea and vomiting. New or unusual symptoms. Follow-up care: You should have a follow up appointment with Dr. Sidra Del Rosario to review the results of the liver biopsy results in 2 weeks. If you do not have an appointment please call the office at the number listed above to schedule this. Other instructions: If you have any problems or questions call the [facility] office at the phone number listed above. DISCHARGE SUMMARY from Nurse: The following personal items collected during your admission are returned to you:   Dental Appliance: Dental Appliances: None  Vision: Visual Aid: Contacts  Hearing Aid:    Jewelry:    Clothing:    Other Valuables:    Valuables sent to safe:            Learning About Coronavirus (COVID-19)  Coronavirus (COVID-19): Overview  What is coronavirus (TJENO-56)? The coronavirus disease (COVID-19) is caused by a virus. It is an illness that was first found in [place], [place], in December 2019. It has since spread worldwide. The virus can cause fever, cough, and trouble breathing. In severe cases, it can cause pneumonia and make it hard to breathe without help. It can cause death. Coronaviruses are a large group of viruses. They cause the common cold. They also cause more serious illnesses like Middle East respiratory syndrome (MERS) and severe acute respiratory syndrome (SARS). COVID-19 is caused by a novel coronavirus. That means it's a new type that has not been seen in people before. This virus spreads person-to-person through droplets from coughing and sneezing.  It can also spread when you are close to someone who is infected. And it can spread when you touch something that has the virus on it, such as a doorknob or a tabletop. What can you do to protect yourself from coronavirus (COVID-19)? The best way to protect yourself from getting sick is to:  · Avoid areas where there is an outbreak. · Avoid contact with people who may be infected. · Wash your hands often with soap or alcohol-based hand sanitizers. · Avoid crowds and try to stay at least 6 feet away from other people. · Wash your hands often, especially after you cough or sneeze. Use soap and water, and scrub for at least 20 seconds. If soap and water aren't available, use an alcohol-based hand . · Avoid touching your mouth, nose, and eyes. What can you do to avoid spreading the virus to others? To help avoid spreading the virus to others:  · Cover your mouth with a tissue when you cough or sneeze. Then throw the tissue in the trash. · Use a disinfectant to clean things that you touch often. · Stay home if you are sick or have been exposed to the virus. Don't go to school, work, or public areas. And don't use public transportation. · If you are sick:  ? Leave your home only if you need to get medical care. But call the doctor's office first so they know you're coming. And wear a face mask, if you have one.  ? If you have a face mask, wear it whenever you're around other people. It can help stop the spread of the virus when you cough or sneeze. ? Clean and disinfect your home every day. Use household  and disinfectant wipes or sprays. Take special care to clean things that you grab with your hands. These include doorknobs, remote controls, phones, and handles on your refrigerator and microwave. And don't forget countertops, tabletops, bathrooms, and computer keyboards. When to call for help  Call 911 anytime you think you may need emergency care. For example, call if:  · You have severe trouble breathing.  (You can't talk at all.)  · You have constant chest pain or pressure. · You are severely dizzy or lightheaded. · You are confused or can't think clearly. · Your face and lips have a blue color. · You pass out (lose consciousness) or are very hard to wake up. Call your doctor now if you develop symptoms such as:  · Shortness of breath. · Fever. · Cough. If you need to get care, call ahead to the doctor's office for instructions before you go. Make sure you wear a face mask, if you have one, to prevent exposing other people to the virus. Where can you get the latest information? The following health organizations are tracking and studying this virus. Their websites contain the most up-to-date information. Lavon Strong also learn what to do if you think you may have been exposed to the virus. · U.S. Centers for Disease Control and Prevention (CDC): The CDC provides updated news about the disease and travel advice. The website also tells you how to prevent the spread of infection. www.cdc.gov  · World Health Organization [facility]): WHO offers information about the virus outbreaks. WHO also has travel advice. www.who.int  Current as of: April 1, 2020               Content Version: 12.4  © 2997-8447 [employer], [employer]. Care instructions adapted under license by your healthcare professional. If you have questions about a medical condition or this instruction, always ask your healthcare professional. [address] any warranty or liability for your use of this information.   Patient armband removed and shredded

## 2022-03-16 NOTE — PERIOP NOTES
Face to face  Discharged  instruction given to family and agreed with the plan. Discharged includes diet, activity limitations , medication to continue and f/u appointment. D/c to home in stable condition with care of family.

## 2022-03-16 NOTE — H&P
[address] [facility], MD, [address], [place], [place]      BRINA Andrew, [facility]     Kaitlin Zamudio, [facility]   LENA Kinney, [facility]       Lenore Mccl[address]    at [address], [address] Bettie Hernandez  22.    [phone]    FAX: [address] [place] [address]    at [address], [address]    [phone]    FAX: [phone]         PRE-PROCEDURE NOTE - LIVER BIOPSY    H and P from last office visit reviewed. Allergies reviewed. Out-patient medication list reviewed. Patient Active Problem List   Diagnosis Code    Type II diabetes mellitus ([facility] E11.9    Thrush B37.0    Morbid obesity ([facility] E66.01    Cholelithiasis K80.20    Hypertension I10    Severe dehydration E86.0    Hypothyroidism E03.9    Retinopathy due to secondary diabetes ([facility] E13.319    Diabetic polyneuropathy associated with type 2 diabetes mellitus (HCC) E11.42       Allergies   Allergen Reactions    Codeine Itching    Gabapentin Other (comments)     \"Makes my skin crawl\"    Lyrica [Pregabalin] Other (comments)     \"Makes my skin crawl\"       No current facility-administered medications on file prior to encounter. Current Outpatient Medications on File Prior to Encounter   Medication Sig Dispense Refill    dapagliflozin (Farxiga) 5 mg tab tablet Take 5 mg by mouth daily.  atorvastatin (LIPITOR) 20 mg tablet Take 20 mg by mouth nightly.  levothyroxine (SYNTHROID) 75 mcg tablet Take 150 mcg by mouth daily.  lisinopriL (PRINIVIL, ZESTRIL) 20 mg tablet TAKE 1 TABLET BY MOUTH ONCE DAILY      metoprolol succinate (TOPROL-XL) 50 mg XL tablet Take 100 mg by mouth daily.       traMADoL (ULTRAM) 50 mg tablet Take 50 mg by mouth three (3) times daily as needed.  insulin glargine (Lantus Solostar U-100 Insulin) 100 unit/mL (3 mL) inpn 25 Units by SubCUTAneous route nightly. (Patient taking differently: 30 Units by SubCUTAneous route nightly.) 5 Pen 2    insulin lispro (HUMALOG) 100 unit/mL kwikpen Check blood sugars before meals  For Blood Sugar (mg/dL) of:     Less than 150 =   0 units           150 -199 =   2 units  200 -249 =   4 units  250 -299 =   6 units  300 -349 =   8 units  350 and above =   10 units  Call MD 1 Package 2    hydroCHLOROthiazide (HYDRODIURIL) 25 mg tablet Take 25 mg by mouth daily. (Patient not taking: Reported on 3/16/2022)      Blood-Glucose Meter monitoring kit Check blood sugars ACHS 1 Kit 0    glucose blood VI test strips (blood glucose test) strip Check blood sugars ACHS 100 Strip 2    lancets misc Check blood sugars ACHS 100 Each 11       For liver biopsy to assess NALFD. The risks of the procedure were discussed with the patient. This included bleeding, pain, and puncture of other organs. All questions were answered. The patient wishes to proceed with the procedure. The patient was counseled at length about the risks of lizette Covid-19 in the cody-operative and post-operative states including the recovery window of their procedure. The patient was made aware that lizette Covid-19 after a surgical procedure may worsen their prognosis for recovering from the virus and lend to a higher morbidity and or mortality risk. The patient was given the options of postponing their procedure. All of the risks, benefits, and alternatives were discussed. The patient does  wish to proceed with the procedure.       PHYSICAL EXAMINATION:  Visit Vitals  BP (!) 184/97   Pulse 71   Temp 97 °F (36.1 °C)   Resp 15   Ht 5' 5\" (1.651 m)   Wt 229 lb 14.4 oz (104.3 kg)   LMP 02/16/2022   SpO2 100%   Breastfeeding No Comment: negative urine and verified by Sonya Coyle RN   BMI 38.26 kg/m²       General: No acute distress. Eyes: Sclera anicteric. ENT: No oral lesions. Thyroid normal.  Nodes: No adenopathy. Skin: No spider angiomata. No jaundice. No palmar erythema. Respiratory: Lungs clear to auscultation. Cardiovascular: Regular heart rate. No murmurs. No JVD. Abdomen: Soft non-tender, liver size normal to percussion/palpation. Spleen not palpable. No obvious ascites. Extremities: No edema. No muscle wasting. No gross arthritic changes. Neurologic: Alert and oriented. Cranial nerves grossly intact. No asterixis. LABS:  Lab Results   Component Value Date/Time    WBC 11.6 (H) 08/16/2021 12:00 AM    HGB 12.9 08/16/2021 12:00 AM    HCT 38.6 08/16/2021 12:00 AM    PLATELET 847 73/76/6804 12:00 AM    MCV 88 08/16/2021 12:00 AM     Lab Results   Component Value Date/Time    INR 0.9 08/16/2021 12:00 AM    Prothrombin time 9.9 08/16/2021 12:00 AM       ASSESSMENT AND PLAN:  Liver biopsy under ultrasound guidance.     Davida Galicia MD  [address]  [address] [place] [address], [address] [place] [address], [address] [place] [address] - [address]  12 [facility]

## 2022-03-28 ENCOUNTER — OFFICE VISIT (OUTPATIENT)
Dept: HEMATOLOGY | Age: 42
End: 2022-03-28
Payer: MEDICAID

## 2022-03-28 VITALS
SYSTOLIC BLOOD PRESSURE: 173 MMHG | HEIGHT: 65 IN | DIASTOLIC BLOOD PRESSURE: 107 MMHG | BODY MASS INDEX: 38.35 KG/M2 | HEART RATE: 79 BPM | TEMPERATURE: 97.5 F | OXYGEN SATURATION: 99 % | WEIGHT: 230.2 LBS

## 2022-03-28 DIAGNOSIS — K74.3 PRIMARY BILIARY CHOLANGITIS (HCC): Primary | ICD-10-CM

## 2022-03-28 PROCEDURE — 99214 OFFICE O/P EST MOD 30 MIN: CPT | Performed by: INTERNAL MEDICINE

## 2022-03-28 NOTE — PROGRESS NOTES
[address] [facility]      Chen Padilla MD, [place], Cite Gonzalo Pennington, Nadine Soto MD, MPH      Bradley Singh, PA-DENISE Crenshaw, [facility]     Kaitlin Zamudio, [facility]   Marycarmen Vincent, FNP-C    Chava Navarro, [facility]       Lenore Ramon [facility] 136    at [address], [facility] Bettie Hernandez  22.    [phone]    FAX: [address] [place] [address] Avenue    [address], [address], [address]    [phone]    [address] [place] [address]: [phone]       Patient Care Team:  Babak Shah MD as PCP - General (Family Medicine)      Problem List  Date Reviewed: 8/31/2021          Codes Class Noted    Hypothyroidism ICD-10-CM: E03.9  ICD-9-CM: 244.9  8/16/2021        Retinopathy due to secondary diabetes [facility]) ICD-10-CM: E13.319  ICD-9-CM: 249.50, 362.01  8/16/2021        Diabetic polyneuropathy associated with type 2 diabetes mellitus ([facility] ICD-10-CM: E11.42  ICD-9-CM: 250.60, 357.2  8/16/2021        Type II diabetes mellitus ([facility] ICD-10-CM: E11.9  ICD-9-CM: 250.00  5/25/2021        [place]rush ICD-10-CM: B37.0  ICD-9-CM: 112.0  5/25/2021        Morbid obesity ([facility] ICD-10-CM: E66.01  ICD-9-CM: 278.01  5/25/2021        Cholelithiasis ICD-10-CM: K80.20  ICD-9-CM: 574.20  5/25/2021        Hypertension ICD-10-CM: I10  ICD-9-CM: 401.9  5/25/2021        Severe dehydration ICD-10-CM: E86.0  ICD-9-CM: 276.51  5/25/2021                Yanni Hay is being seen at [address] for management of elevated liver enzymes. The active problem list, all pertinent past medical history, medications, radiologic findings and laboratory findings related to the liver disorder were reviewed and discussed with the patient. The patient is a 42 y.o.   female who was found to have elevated alkaline phosphatase in 2019. Serologic evaluation for markers of chronic liver disease was negative     The most recent imaging of the liver was MRI performed in 8/2021. Results suggest no evidence of bile duct disease. Fibroscan performed in 11/2021 was 8.5 kPa which correlates with stage 2 septal fibrosis. The CAP score of 388 suggests hepatic steatosis. The patient underwent a liver biopsy in 3/2022. The procedure was well tolerated. I have personally reviewed and interpreted the liver biopsy slides. This demonstrates centrolobular sinusoidal dilation, 25-33% steatosis, moderate portal fibrosis that was greater than the degree of portal fibrosis. No interface inflammation, no PMN, Bile ducts irregular and swollen. A ECHO showed LVEF 55-60%, mild PA HTN with estimated PAP 30 mm Hg, mild TR. The patient has the following symptoms which could be attributed to the liver disorder:    fatigue,   pain in the right side over the liver,   Moderate degree of itching    The patient is not experiencing the following symptoms which are commonly seen in this liver disorder:   nausea,   vomiting,     The patient completes all daily activities without any functional limitations. ASSESSMENT AND PLAN:  Primary Biliary Cholangitis  The diagnosis is based upon liver biopsy, and an elevation in ALP. AMA is negative    A liver biopsy was performed in 3/2022. This demonstrates bile duct changes consistent with PBC and stage 1 portal fibrosis. Liver transaminases are elevated. ALP is elevated. Liver function is normal.  The platelet count is normal.      The patient will be started on NILAM at a dose of 1500 mg every day if she cannot enter the Mirum clinical trial.  The side effects of NILAM including a 2% risk of itching and a 2% risk of diarrhea were discussed. Itching   This is secondary to the underlying liver disease.     his appears to be moderate   Itching Periactin up to TID as needed  Will evaluate for Mirum clinical trial.    Hypercholesterolemia   This is common in patients with PBC. Controlled clinical trials demonstrate that women with PBC do not have an increased risk of CAD depsite the elevated total cholesterol. The cholesterol is typically HDL and does not always require treatment. Stains are not contraindicated if felt to be clinically warranted. Vitamin malabsorption  Patients with PBC do not efficiently absorb fat soluble vitamins A,D,E, K. It has been recommended that the patient take multivitamins with excess fat soluble vitamins. Breast cancer screeing   Women with PBC have an increased risk of breast cancer and should undergo regular mammography screenings. Screening for Hepatocellular Carcinoma  HCC screening is not necessary if the patient has no evidence of cirrhosis. Treatment of other medical problems in patients with chronic liver disease  There are no contraindications for the patient to take most medications that are necessary for treatment of other medical issues. Counseling for alcohol in patients with chronic liver disease  The patient was counseled regarding alcohol consumption and the effect of alcohol on chronic liver disease. The patient does not consume any significant amount of alcohol. Vaccinations   Vaccination for viral hepatitis A is recommended since the patient has no serologic evidence of previous exposure or vaccination with immunity. HAV and HBV is not needed. Routine vaccinations against other bacterial and viral agents can be performed as indicated. Annual flu vaccination should be administered if indicated.       ALLERGIES  Allergies   Allergen Reactions    Codeine Itching    Gabapentin Other (comments)     \"Makes my skin crawl\"    Lyrica [Pregabalin] Other (comments)     \"Makes my skin crawl\"       MEDICATIONS  Current Outpatient Medications   Medication Sig    dapagliflozin (Farxiga) 5 mg tab tablet Take 5 mg by mouth daily.    atorvastatin (LIPITOR) 20 mg tablet Take 20 mg by mouth nightly.  levothyroxine (SYNTHROID) 75 mcg tablet Take 150 mcg by mouth daily.  lisinopriL (PRINIVIL, ZESTRIL) 20 mg tablet TAKE 1 TABLET BY MOUTH ONCE DAILY    metoprolol succinate (TOPROL-XL) 50 mg XL tablet Take 100 mg by mouth daily.  traMADoL (ULTRAM) 50 mg tablet Take 50 mg by mouth three (3) times daily as needed.  insulin glargine (Lantus Solostar U-100 Insulin) 100 unit/mL (3 mL) inpn 25 Units by SubCUTAneous route nightly. (Patient taking differently: 30 Units by SubCUTAneous route nightly.)    insulin lispro (HUMALOG) 100 unit/mL kwikpen Check blood sugars before meals  For Blood Sugar (mg/dL) of:     Less than 150 =   0 units           150 -199 =   2 units  200 -249 =   4 units  250 -299 =   6 units  300 -349 =   8 units  350 and above =   10 units  Call MD    Blood-Glucose Meter monitoring kit Check blood sugars ACHS    glucose blood VI test strips (blood glucose test) strip Check blood sugars ACHS    lancets misc Check blood sugars ACHS    hydroCHLOROthiazide (HYDRODIURIL) 25 mg tablet Take 25 mg by mouth daily. (Patient not taking: Reported on 3/16/2022)     No current facility-administered medications for this visit. SYSTEM REVIEW NOT RELATED TO LIVER DISEASE OR REVIEWED ABOVE:  Constitution systems: Negative for fever, chills, weight gain, weight loss. Eyes: Negative for visual changes. ENT: Negative for sore throat, painful swallowing. Respiratory: Negative for cough, hemoptysis, SOB. Cardiology: Negative for chest pain, palpitations. GI:  Negative for constipation or diarrhea. : Negative for urinary frequency, dysuria, hematuria, nocturia. Skin: Negative for rash. Hematology: Negative for easy bruising, blood clots. Musculo-skelatal: Negative for back pain, muscle pain, weakness. Neurologic: Negative for headaches, dizziness, vertigo, memory problems not related to HE.   Psychology: Negative for anxiety, depression. FAMILY HISTORY:  The father Has/had the following following chronic disease(s): None. The mother Has/had the following chronic disease(s): None. There is no family history of liver disease. SOCIAL HISTORY:  The patient has never been . The patient has 1 child and 2 adopted children   The patient has never used tobacco products. The patient has never consumed significant amounts of alcohol. The patient currently works full time as in home . PHYSICAL EXAMINATION:  Visit Vitals  BP (!) 173/107 (BP 1 Location: Left upper arm, BP Patient Position: Sitting)   Pulse 79   Temp 97.5 °F (36.4 °C) (Temporal)   Ht 5' 5\" (1.651 m)   Wt 230 lb 3.2 oz (104.4 kg)   SpO2 99%   BMI 38.31 kg/m²     General: No acute distress. Eyes: Sclera anicteric. ENT: No oral lesions. Thyroid normal.  Nodes: No adenopathy. Skin: No spider angiomata. No jaundice. No palmar erythema. Respiratory: Lungs clear to auscultation. Cardiovascular: Regular heart rate. No murmurs. No JVD. Abdomen: Soft non-tender. Liver size normal to percussion/palpation. Spleen not palpable. No obvious ascites. Extremities: No edema. No muscle wasting. No gross arthritic changes. Neurologic: Alert and oriented. Cranial nerves grossly intact. No asterixis. LABORATORY STUDIES:  Liver [place] of [address] Units 8/16/2021   WBC 3.4 - 10.8 x10E3/uL 11.6 (H)   ANC 1.4 - 7.0 x10E3/uL 6.9   HGB 11.1 - 15.9 g/dL 12.9    - 450 x10E3/uL 303   INR 0.9 - 1.2 0.9   AST 0 - 40 IU/L 43 (H)   ALT 0 - 32 IU/L 41 (H)   Alk Phos 48 - 121 IU/L 430 (H)   Bili, Total 0.0 - 1.2 mg/dL 0.3   Bili, Direct 0.00 - 0.40 mg/dL 0.13   Albumin 3.8 - 4.8 g/dL 4.2   BUN 6 - 24 mg/dL 28 (H)   Creat 0.57 - 1.00 mg/dL 1.29 (H)   Na 134 - 144 mmol/L 138   K 3.5 - 5.2 mmol/L 5.3 (H)   Cl 96 - 106 mmol/L 101   CO2 20 - 29 mmol/L 24   Glucose 65 - 99 mg/dL 213 (H)     SEROLOGIES:  6/2021. HBsurface antigen negative, anti-HBsurface negative, anti-HCV negative    Serologies Latest Ref Rng & Units 8/16/2021   Hep A Ab, Total Negative Negative   TONE, IFA  Negative   C-ANCA Neg:<1:20 titer <1:20   P-ANCA Neg:<1:20 titer <1:20   ANCA Neg:<1:20 titer <1:20   ASMCA 0 - 19 Units 16   M2 Ab 0.0 - 20.0 Units <20.0   Ceruloplasmin 19.0 - 39.0 mg/dL 32.2   Alpha-1 antitrypsin level 101 - 187 mg/dL 159     LIVER HISTOLOGY:  11/2021. FibroScan performed at [address]. EkPa was 8.5. IQR/med 25%. . The results suggested a fibrosis level of F2. The CAP score suggests there is hepatic steatosis. 3/2022. Slides reviewed by MLS.  mild portal inflammation, with no interface hepatitis, with no PMN. Bile ducts are irregualr and swollen. no lobular inflammation. 25-33% steatosis. Gloria stage 2 fibrosis. Metavir stage 1 fibrosis. Consistent with PBC.      ENDOSCOPIC PROCEDURES:  Not available or performed    RADIOLOGY:  5/2021. CT scan abdomen without IV contrast.  Changes consistent with fatty liver. No liver mass lesions. Normal spleen. No ascites. 8/2021. Dynamic MRI of liver. Normal appearing liver. No liver mass lesions. Normal spleen. No dilated bile ducts. No bile duct strictures. No ascites. OTHER TESTING:  Not available or performed    FOLLOW-UP:  All of the issues listed above in the Assessment and Plan were discussed with the patient. All questions were answered. The patient expressed a clear understanding of the above. Follow-up Boydsina Browningjason [address] 31 in for screening and enrollment into a clinical trial for treatment of PBC with itching in the [place] 52 study.   The patient was given a follow-up appointment for 3 months in case she decides not to enter or is excluded from entering the clinical trial.      Caren Gale MD  [id number], [address] [facility] [address] [facility]

## 2022-03-28 NOTE — PROGRESS NOTES
Severo Murders presents today for   Chief Complaint   Patient presents with    Follow-up       Is someone accompanying this pt? no    Is the patient using any DME equipment during OV? no    Coordination of Care:  1. Have you been to the ER, urgent care clinic since your last visit? Hospitalized since your last visit? no    2. Have you seen or consulted any other health care providers outside of the [address] [place] [address] since your last visit? Include any pap smears or colon screening.  no

## 2022-10-03 NOTE — Clinical Note
4/23/2022    Patient: Meli Leigh   YOB: 1980   Date of Visit: 3/28/2022     Nell Camacho MD  [address] [id number]  Via Fax: [phone]    Dear Nell Camacho MD,      Thank you for referring Ms. Michelle Leung to [address] [place] [address],[address] for evaluation. My notes for this consultation are attached. If you have questions, please do not hesitate to call me. I look forward to following your patient along with you.       Sincerely,    Mario Mcfadden MD Impression: Long term (current) use of insulin Plan: Stressed the importance compliance, blood glucose, and blood pressure control in preventing progression of retinopathy and/or maculopathy and potentially irreversible vision loss and blindness

## (undated) DEVICE — SYR 10ML LUER LOK 1/5ML GRAD --

## (undated) DEVICE — SPONGE GZ W4XL4IN COT 12 PLY TYP VII WVN C FLD DSGN

## (undated) DEVICE — DRAPE,APERTURE,MINOR PROCEDURE: Brand: MEDLINE

## (undated) DEVICE — INTENDED FOR TISSUE SEPARATION, AND OTHER PROCEDURES THAT REQUIRE A SHARP SURGICAL BLADE TO PUNCTURE OR CUT.: Brand: BARD-PARKER ®  SAFETY SCALPED

## (undated) DEVICE — HYPODERMIC SAFETY NEEDLE: Brand: MAGELLAN

## (undated) DEVICE — (D)BNDG ADHESIVE FABRIC 3/4X3 -- DISC BY MFR USE ITEM 357960

## (undated) DEVICE — NEEDLE SPNL 20GA L3.5IN YEL HUB S STL REG WALL FIT STYL W/

## (undated) DEVICE — TRAY PREP DRY W/ PREM GLV 2 APPL 6 SPNG 2 UNDPD 1 OVERWRAP

## (undated) DEVICE — SKIN MARKER,REGULAR TIP WITH RULER AND LABELS: Brand: DEVON

## (undated) DEVICE — MAJ-1414 SINGLE USE ADPATER BIOPSY VALV: Brand: SINGLE USE ADAPTOR BIOPSY VALVE

## (undated) DEVICE — KENDALL RADIOLUCENT FOAM MONITORING ELECTRODE RECTANGULAR SHAPE: Brand: KENDALL

## (undated) DEVICE — MAX-CORE® DISPOSABLE CORE BIOPSY INSTRUMENT, 16G X 16CM: Brand: MAX-CORE

## (undated) DEVICE — PAD NON-ADHERENT 3X4 STRL LF --

## (undated) DEVICE — MAYO STAND COVER: Brand: CONVERTORS

## (undated) DEVICE — SOL IRRIGATION INJ NACL 0.9% 500ML BTL